# Patient Record
Sex: MALE | Race: OTHER | NOT HISPANIC OR LATINO | URBAN - METROPOLITAN AREA
[De-identification: names, ages, dates, MRNs, and addresses within clinical notes are randomized per-mention and may not be internally consistent; named-entity substitution may affect disease eponyms.]

---

## 2020-06-30 VITALS
RESPIRATION RATE: 20 BRPM | WEIGHT: 205.91 LBS | OXYGEN SATURATION: 98 % | TEMPERATURE: 98 F | HEART RATE: 78 BPM | DIASTOLIC BLOOD PRESSURE: 78 MMHG | SYSTOLIC BLOOD PRESSURE: 125 MMHG | HEIGHT: 70 IN

## 2020-06-30 RX ORDER — CHLORHEXIDINE GLUCONATE 213 G/1000ML
1 SOLUTION TOPICAL ONCE
Refills: 0 | Status: DISCONTINUED | OUTPATIENT
Start: 2020-07-01 | End: 2020-07-01

## 2020-06-30 NOTE — H&P ADULT - ASSESSMENT
64 year old male, current smoker, with PMHx HTN, HLD, Type 2 DM, CAD s/p PCI x3 (most recent 2015 @ [place], [place]), BPH, presents for cardiac catheterization with possible intervention due to patient's risk factors, CCS III anginal symptoms, and abnormal NST.  - EKG:  - ASA: III 	Mallampati: III  - H/H stable: 15.8/49.5. Platelets/Coags stable.  - Patient endorses not taking Aspirin 81 mg in two days. Patient given Aspirin 81 mg and Plavix 600 mg today 07/01/2020.  - Cr stable: ____.  Euvolemic on exam. AS per MD note, normal EF. Patient given NS @ 75 cc/hr x 4 hours  - Patient's last dose of Metformin 06/30/2020 in AM. Patient started on insulin sliding scale. Instructed patient to not resume Metformin until 07/03/2020.  - Patient is a suitable candidate for moderate sedation.   Risks & benefits of procedure and alternative therapy have been explained to the patient including but not limited to: allergic reaction, bleeding w/possible need for blood transfusion, infection, renal and vascular compromise, limb damage, arrhythmia, stroke, vessel dissection/perforation, Myocardial infarction, emergent CABG. Informed consent obtained and in chart. 64 year old male, current smoker, with PMHx HTN, HLD, Type 2 DM, CAD s/p PCI x3 (most recent 2015 @ [place], [place]), BPH, presents for cardiac catheterization with possible intervention due to patient's risk factors, CCS III anginal symptoms, and abnormal NST.  - EKG:  - ASA: III 	Mallampati: III  - H/H stable: 15.8/49.5. Platelets/Coags stable.  - Patient endorses not taking Aspirin 81 mg in two days. Patient given Aspirin 81 mg and Plavix 600 mg today 07/01/2020.  - Cr stable: 1.1  Euvolemic on exam. AS per MD note, normal EF. Patient given NS @ 75 cc/hr x 4 hours  - Patient's last dose of Metformin 06/30/2020 in AM. Patient started on insulin sliding scale. Instructed patient to not resume Metformin until 07/03/2020.  - Patient is a suitable candidate for moderate sedation.   Risks & benefits of procedure and alternative therapy have been explained to the patient including but not limited to: allergic reaction, bleeding w/possible need for blood transfusion, infection, renal and vascular compromise, limb damage, arrhythmia, stroke, vessel dissection/perforation, Myocardial infarction, emergent CABG. Informed consent obtained and in chart.

## 2020-06-30 NOTE — H&P ADULT - HISTORY OF PRESENT ILLNESS
COVID test @ [facility] Madhav Cove 6/29____  VERIFY MEDS  64 year old male, current smoker, with PMHx HTN, HLD, Type 2 DM, CAD s/p PCI x3 (most recent 2015 @ [place], [place]), BPH, presented to cardiologist Dr. Beltran c/o shortness of  breath with exertion. Patient states he develops significant SOB and fatigue after walking for 1 minute. He also endorses intermittent chest pain lasting a couple of minutes, unrelated to exertion. He denies dizziness, palpitations, LOC, orthopnea/PND, or leg swelling. No fevers, chills, cough, or COVID-like symptoms. Pt subsequently underwent pharm NST 6/26/20 revealing medium size reversible perfusion defect of moderate intensity involving the basal-mid inferior/inferoseptal myocardial walls; and small-medium size reversible perfusion defect of moderate intensity involving the mid-apical anteroseptal myocardial wall. Normal LV systolic function.   In light of risk factors, known CAD s/p PCI, CCS III anginal symptoms, and abnormal NST, patient is referred for cardiac catheterization with possible intervention if clinically indicated. COVID test @ [facility] Cove 6/29 NEGATIVE  64 year old male, current smoker, with PMHx HTN, HLD, Type 2 DM, CAD s/p PCI x3 (most recent 2015 @ [place], [place]), BPH, presented to cardiologist Dr. Beltran c/o shortness of  breath with exertion. Patient states he develops significant SOB and fatigue after walking for 1 minute. He also endorses intermittent chest pain lasting a couple of minutes, unrelated to exertion. He denies dizziness, palpitations, LOC, orthopnea/PND, or leg swelling. No fevers, chills, cough, or COVID-like symptoms. Pt subsequently underwent pharm NST 6/26/20 revealing medium size reversible perfusion defect of moderate intensity involving the basal-mid inferior/inferoseptal myocardial walls; and small-medium size reversible perfusion defect of moderate intensity involving the mid-apical anteroseptal myocardial wall. Normal LV systolic function.   In light of risk factors, known CAD s/p PCI, CCS III anginal symptoms, and abnormal NST, patient is referred for cardiac catheterization with possible intervention if clinically indicated. COVID test @ [facility] Mad[facility] Cove 6/29 NEGATIVE, results in paper chart   64 year old male, current smoker, with PMHx HTN, HLD, Type 2 DM, CAD s/p PCI x3 (most recent 2015 @ [place], [place]), BPH, presented to cardiologist Dr. Beltran c/o shortness of  breath with exertion. Patient states he develops significant SOB and fatigue after walking for 1 minute. He also endorses intermittent chest pain lasting a couple of minutes, unrelated to exertion. He denies dizziness, palpitations, LOC, orthopnea/PND, or leg swelling. No fevers, chills, cough, or COVID-like symptoms. Pt subsequently underwent pharm NST 6/26/20 revealing medium size reversible perfusion defect of moderate intensity involving the basal-mid inferior/inferoseptal myocardial walls; and small-medium size reversible perfusion defect of moderate intensity involving the mid-apical anteroseptal myocardial wall. Normal LV systolic function.   In light of risk factors, known CAD s/p PCI, CCS III anginal symptoms, and abnormal NST, patient is referred for cardiac catheterization with possible intervention if clinically indicated.

## 2020-06-30 NOTE — H&P ADULT - NSICDXPASTMEDICALHX_GEN_ALL_CORE_FT
PAST MEDICAL HISTORY:  BPH (benign prostatic hyperplasia)     Coronary artery disease     Hyperlipidemia     Hypertension     Type 2 diabetes mellitus

## 2020-07-01 ENCOUNTER — OUTPATIENT (OUTPATIENT)
Dept: OUTPATIENT SERVICES | Facility: HOSPITAL | Age: 65
LOS: 1 days | Discharge: HOME | End: 2020-07-01
Payer: COMMERCIAL

## 2020-07-01 DIAGNOSIS — Z90.09 ACQUIRED ABSENCE OF OTHER PART OF HEAD AND NECK: Chronic | ICD-10-CM

## 2020-07-01 LAB
A1C WITH ESTIMATED AVERAGE GLUCOSE RESULT: 7 % — HIGH (ref 4–5.6)
A1C WITH ESTIMATED AVERAGE GLUCOSE RESULT: 7.2 % — HIGH (ref 4–5.6)
ALBUMIN SERPL ELPH-MCNC: 4.6 G/DL — SIGNIFICANT CHANGE UP (ref 3.3–5)
ALP SERPL-CCNC: 68 U/L — SIGNIFICANT CHANGE UP (ref 40–120)
ALT FLD-CCNC: 13 U/L — SIGNIFICANT CHANGE UP (ref 10–45)
ANION GAP SERPL CALC-SCNC: 10 MMOL/L — SIGNIFICANT CHANGE UP (ref 5–17)
ANION GAP SERPL CALC-SCNC: 9 MMOL/L — SIGNIFICANT CHANGE UP (ref 5–17)
APTT BLD: 31.5 SEC — SIGNIFICANT CHANGE UP (ref 27.5–35.5)
AST SERPL-CCNC: 12 U/L — SIGNIFICANT CHANGE UP (ref 10–40)
BASOPHILS # BLD AUTO: 0.02 K/UL — SIGNIFICANT CHANGE UP (ref 0–0.2)
BASOPHILS NFR BLD AUTO: 0.3 % — SIGNIFICANT CHANGE UP (ref 0–2)
BILIRUB SERPL-MCNC: 0.5 MG/DL — SIGNIFICANT CHANGE UP (ref 0.2–1.2)
BUN SERPL-MCNC: 17 MG/DL — SIGNIFICANT CHANGE UP (ref 7–23)
BUN SERPL-MCNC: 18 MG/DL — SIGNIFICANT CHANGE UP (ref 7–23)
CALCIUM SERPL-MCNC: 10 MG/DL — SIGNIFICANT CHANGE UP (ref 8.4–10.5)
CALCIUM SERPL-MCNC: 9.3 MG/DL — SIGNIFICANT CHANGE UP (ref 8.4–10.5)
CHLORIDE SERPL-SCNC: 102 MMOL/L — SIGNIFICANT CHANGE UP (ref 96–108)
CHLORIDE SERPL-SCNC: 103 MMOL/L — SIGNIFICANT CHANGE UP (ref 96–108)
CHOLEST SERPL-MCNC: 105 MG/DL — SIGNIFICANT CHANGE UP (ref 10–199)
CK MB CFR SERPL CALC: 2.2 NG/ML — SIGNIFICANT CHANGE UP (ref 0–6.7)
CK SERPL-CCNC: 95 U/L — SIGNIFICANT CHANGE UP (ref 30–200)
CO2 SERPL-SCNC: 27 MMOL/L — SIGNIFICANT CHANGE UP (ref 22–31)
CO2 SERPL-SCNC: 28 MMOL/L — SIGNIFICANT CHANGE UP (ref 22–31)
CREAT SERPL-MCNC: 1.13 MG/DL — SIGNIFICANT CHANGE UP (ref 0.5–1.3)
CREAT SERPL-MCNC: 1.13 MG/DL — SIGNIFICANT CHANGE UP (ref 0.5–1.3)
EOSINOPHIL # BLD AUTO: 0.09 K/UL — SIGNIFICANT CHANGE UP (ref 0–0.5)
EOSINOPHIL NFR BLD AUTO: 1.2 % — SIGNIFICANT CHANGE UP (ref 0–6)
ESTIMATED AVERAGE GLUCOSE: 160 MG/DL — HIGH (ref 68–114)
GLUCOSE SERPL-MCNC: 123 MG/DL — HIGH (ref 70–99)
GLUCOSE SERPL-MCNC: 166 MG/DL — HIGH (ref 70–99)
HCT VFR BLD CALC: 42.2 % — SIGNIFICANT CHANGE UP (ref 39–50)
HCT VFR BLD CALC: 49.5 % — SIGNIFICANT CHANGE UP (ref 39–50)
HCV AB S/CO SERPL IA: 0.1 S/CO — SIGNIFICANT CHANGE UP
HCV AB SERPL-IMP: SIGNIFICANT CHANGE UP
HDLC SERPL-MCNC: 28 MG/DL — LOW
HGB BLD-MCNC: 13.4 G/DL — SIGNIFICANT CHANGE UP (ref 13–17)
HGB BLD-MCNC: 15.8 G/DL — SIGNIFICANT CHANGE UP (ref 13–17)
IMM GRANULOCYTES NFR BLD AUTO: 0.6 % — SIGNIFICANT CHANGE UP (ref 0–1.5)
INR BLD: 1.03 — SIGNIFICANT CHANGE UP (ref 0.88–1.16)
LIPID PNL WITH DIRECT LDL SERPL: 48 MG/DL — SIGNIFICANT CHANGE UP
LYMPHOCYTES # BLD AUTO: 2.56 K/UL — SIGNIFICANT CHANGE UP (ref 1–3.3)
LYMPHOCYTES # BLD AUTO: 35.5 % — SIGNIFICANT CHANGE UP (ref 13–44)
MAGNESIUM SERPL-MCNC: 1.4 MG/DL — LOW (ref 1.6–2.6)
MCHC RBC-ENTMCNC: 27.1 PG — SIGNIFICANT CHANGE UP (ref 27–34)
MCHC RBC-ENTMCNC: 27.4 PG — SIGNIFICANT CHANGE UP (ref 27–34)
MCHC RBC-ENTMCNC: 31.8 GM/DL — LOW (ref 32–36)
MCHC RBC-ENTMCNC: 31.9 GM/DL — LOW (ref 32–36)
MCV RBC AUTO: 85.3 FL — SIGNIFICANT CHANGE UP (ref 80–100)
MCV RBC AUTO: 85.8 FL — SIGNIFICANT CHANGE UP (ref 80–100)
MONOCYTES # BLD AUTO: 0.53 K/UL — SIGNIFICANT CHANGE UP (ref 0–0.9)
MONOCYTES NFR BLD AUTO: 7.3 % — SIGNIFICANT CHANGE UP (ref 2–14)
NEUTROPHILS # BLD AUTO: 3.98 K/UL — SIGNIFICANT CHANGE UP (ref 1.8–7.4)
NEUTROPHILS NFR BLD AUTO: 55.1 % — SIGNIFICANT CHANGE UP (ref 43–77)
NRBC # BLD: 0 /100 WBCS — SIGNIFICANT CHANGE UP (ref 0–0)
NRBC # BLD: 0 /100 WBCS — SIGNIFICANT CHANGE UP (ref 0–0)
PLATELET # BLD AUTO: 181 K/UL — SIGNIFICANT CHANGE UP (ref 150–400)
PLATELET # BLD AUTO: 199 K/UL — SIGNIFICANT CHANGE UP (ref 150–400)
POTASSIUM SERPL-MCNC: 4.1 MMOL/L — SIGNIFICANT CHANGE UP (ref 3.5–5.3)
POTASSIUM SERPL-MCNC: 4.5 MMOL/L — SIGNIFICANT CHANGE UP (ref 3.5–5.3)
POTASSIUM SERPL-SCNC: 4.1 MMOL/L — SIGNIFICANT CHANGE UP (ref 3.5–5.3)
POTASSIUM SERPL-SCNC: 4.5 MMOL/L — SIGNIFICANT CHANGE UP (ref 3.5–5.3)
PROT SERPL-MCNC: 7.9 G/DL — SIGNIFICANT CHANGE UP (ref 6–8.3)
PROTHROM AB SERPL-ACNC: 12.3 SEC — SIGNIFICANT CHANGE UP (ref 10.6–13.6)
RBC # BLD: 4.95 M/UL — SIGNIFICANT CHANGE UP (ref 4.2–5.8)
RBC # BLD: 5.77 M/UL — SIGNIFICANT CHANGE UP (ref 4.2–5.8)
RBC # FLD: 14.4 % — SIGNIFICANT CHANGE UP (ref 10.3–14.5)
RBC # FLD: 14.4 % — SIGNIFICANT CHANGE UP (ref 10.3–14.5)
SODIUM SERPL-SCNC: 139 MMOL/L — SIGNIFICANT CHANGE UP (ref 135–145)
SODIUM SERPL-SCNC: 140 MMOL/L — SIGNIFICANT CHANGE UP (ref 135–145)
TOTAL CHOLESTEROL/HDL RATIO MEASUREMENT: 3.8 RATIO — SIGNIFICANT CHANGE UP (ref 3.4–9.6)
TRIGL SERPL-MCNC: 147 MG/DL — SIGNIFICANT CHANGE UP (ref 10–149)
WBC # BLD: 6.01 K/UL — SIGNIFICANT CHANGE UP (ref 3.8–10.5)
WBC # BLD: 7.22 K/UL — SIGNIFICANT CHANGE UP (ref 3.8–10.5)
WBC # FLD AUTO: 6.01 K/UL — SIGNIFICANT CHANGE UP (ref 3.8–10.5)
WBC # FLD AUTO: 7.22 K/UL — SIGNIFICANT CHANGE UP (ref 3.8–10.5)

## 2020-07-01 PROCEDURE — 85610 PROTHROMBIN TIME: CPT

## 2020-07-01 PROCEDURE — C9600: CPT | Mod: LD

## 2020-07-01 PROCEDURE — 82550 ASSAY OF CK (CPK): CPT

## 2020-07-01 PROCEDURE — 85025 COMPLETE CBC W/AUTO DIFF WBC: CPT

## 2020-07-01 PROCEDURE — 82553 CREATINE MB FRACTION: CPT

## 2020-07-01 PROCEDURE — 93454 CORONARY ARTERY ANGIO S&I: CPT

## 2020-07-01 PROCEDURE — 93010 ELECTROCARDIOGRAM REPORT: CPT | Mod: 77

## 2020-07-01 PROCEDURE — 85027 COMPLETE CBC AUTOMATED: CPT

## 2020-07-01 PROCEDURE — 86803 HEPATITIS C AB TEST: CPT

## 2020-07-01 PROCEDURE — 83036 HEMOGLOBIN GLYCOSYLATED A1C: CPT

## 2020-07-01 PROCEDURE — 80048 BASIC METABOLIC PNL TOTAL CA: CPT

## 2020-07-01 PROCEDURE — 85730 THROMBOPLASTIN TIME PARTIAL: CPT

## 2020-07-01 PROCEDURE — C1887: CPT

## 2020-07-01 PROCEDURE — 80061 LIPID PANEL: CPT

## 2020-07-01 PROCEDURE — 82962 GLUCOSE BLOOD TEST: CPT

## 2020-07-01 PROCEDURE — C1874: CPT

## 2020-07-01 PROCEDURE — 93005 ELECTROCARDIOGRAM TRACING: CPT

## 2020-07-01 PROCEDURE — 80053 COMPREHEN METABOLIC PANEL: CPT

## 2020-07-01 PROCEDURE — C1769: CPT

## 2020-07-01 PROCEDURE — 93010 ELECTROCARDIOGRAM REPORT: CPT

## 2020-07-01 PROCEDURE — C1894: CPT

## 2020-07-01 PROCEDURE — C1725: CPT

## 2020-07-01 PROCEDURE — 83735 ASSAY OF MAGNESIUM: CPT

## 2020-07-01 RX ORDER — ASPIRIN/CALCIUM CARB/MAGNESIUM 324 MG
325 TABLET ORAL ONCE
Refills: 0 | Status: COMPLETED | OUTPATIENT
Start: 2020-07-01 | End: 2020-07-01

## 2020-07-01 RX ORDER — DEXTROSE 50 % IN WATER 50 %
25 SYRINGE (ML) INTRAVENOUS ONCE
Refills: 0 | Status: DISCONTINUED | OUTPATIENT
Start: 2020-07-01 | End: 2020-07-16

## 2020-07-01 RX ORDER — CLOPIDOGREL BISULFATE 75 MG/1
1 TABLET, FILM COATED ORAL
Qty: 30 | Refills: 11
Start: 2020-07-01 | End: 2021-06-25

## 2020-07-01 RX ORDER — ACETAMINOPHEN 500 MG
650 TABLET ORAL ONCE
Refills: 0 | Status: COMPLETED | OUTPATIENT
Start: 2020-07-01 | End: 2020-07-01

## 2020-07-01 RX ORDER — SODIUM CHLORIDE 9 MG/ML
1000 INJECTION, SOLUTION INTRAVENOUS
Refills: 0 | Status: DISCONTINUED | OUTPATIENT
Start: 2020-07-01 | End: 2020-07-16

## 2020-07-01 RX ORDER — DEXTROSE 50 % IN WATER 50 %
15 SYRINGE (ML) INTRAVENOUS ONCE
Refills: 0 | Status: DISCONTINUED | OUTPATIENT
Start: 2020-07-01 | End: 2020-07-16

## 2020-07-01 RX ORDER — MAGNESIUM SULFATE 500 MG/ML
2 VIAL (ML) INJECTION ONCE
Refills: 0 | Status: COMPLETED | OUTPATIENT
Start: 2020-07-01 | End: 2020-07-01

## 2020-07-01 RX ORDER — SODIUM CHLORIDE 9 MG/ML
500 INJECTION INTRAMUSCULAR; INTRAVENOUS; SUBCUTANEOUS
Refills: 0 | Status: DISCONTINUED | OUTPATIENT
Start: 2020-07-01 | End: 2020-07-01

## 2020-07-01 RX ORDER — CLOPIDOGREL BISULFATE 75 MG/1
600 TABLET, FILM COATED ORAL ONCE
Refills: 0 | Status: COMPLETED | OUTPATIENT
Start: 2020-07-01 | End: 2020-07-01

## 2020-07-01 RX ORDER — GLUCAGON INJECTION, SOLUTION 0.5 MG/.1ML
1 INJECTION, SOLUTION SUBCUTANEOUS ONCE
Refills: 0 | Status: DISCONTINUED | OUTPATIENT
Start: 2020-07-01 | End: 2020-07-16

## 2020-07-01 RX ORDER — INSULIN LISPRO 100/ML
VIAL (ML) SUBCUTANEOUS ONCE
Refills: 0 | Status: DISCONTINUED | OUTPATIENT
Start: 2020-07-01 | End: 2020-07-16

## 2020-07-01 RX ORDER — MAGNESIUM OXIDE 400 MG ORAL TABLET 241.3 MG
800 TABLET ORAL ONCE
Refills: 0 | Status: COMPLETED | OUTPATIENT
Start: 2020-07-01 | End: 2020-07-01

## 2020-07-01 RX ORDER — DEXTROSE 50 % IN WATER 50 %
12.5 SYRINGE (ML) INTRAVENOUS ONCE
Refills: 0 | Status: DISCONTINUED | OUTPATIENT
Start: 2020-07-01 | End: 2020-07-16

## 2020-07-01 RX ORDER — SODIUM CHLORIDE 9 MG/ML
500 INJECTION INTRAMUSCULAR; INTRAVENOUS; SUBCUTANEOUS
Refills: 0 | Status: DISCONTINUED | OUTPATIENT
Start: 2020-07-01 | End: 2020-07-16

## 2020-07-01 RX ADMIN — Medication 325 MILLIGRAM(S): at 12:53

## 2020-07-01 RX ADMIN — Medication 650 MILLIGRAM(S): at 18:45

## 2020-07-01 RX ADMIN — CLOPIDOGREL BISULFATE 600 MILLIGRAM(S): 75 TABLET, FILM COATED ORAL at 12:53

## 2020-07-01 RX ADMIN — Medication 50 GRAM(S): at 19:59

## 2020-07-01 RX ADMIN — MAGNESIUM OXIDE 400 MG ORAL TABLET 800 MILLIGRAM(S): 241.3 TABLET ORAL at 19:59

## 2020-07-01 NOTE — PROGRESS NOTE ADULT - SUBJECTIVE AND OBJECTIVE BOX
Interventional Cardiology PA Post PCI SDA Discharge Note      Patient without complaints. Ambulated and voided without difficulties    Afebrile, VSS    PRESCRIPTIONS/HOME MEDICATIONS:  alogliptin-metformin 12.5 mg-1000 mg oral tablet: 1 tab(s) orally 2 times a day  Aspirin Enteric Coated 81 mg oral delayed release tablet: 1 tab(s) orally once a day  carvedilol 3.125 mg oral tablet: 1 tab(s) orally 2 times a day  ezetimibe 10 mg oral tablet: 1 tab(s) orally once a day  losartan 25 mg oral tablet: 1 tab(s) orally once a day  pantoprazole 40 mg oral delayed release tablet: 1 tab(s) orally once a day  Plavix 75 mg oral tablet: 1 tab(s) orally once a day   rosuvastatin 40 mg oral tablet: 1 tab(s) orally once a day  tamsulosin 0.4 mg oral capsule: 1 cap(s) orally once a day        CURRENT MEDICATIONS:   dextrose 40% Gel 15 Gram(s) Oral Once PRN  dextrose 5%. 1000 milliLiter(s) IV Continuous <Continuous>  dextrose 50% Injectable 12.5 Gram(s) IV Push Once  dextrose 50% Injectable 25 Gram(s) IV Push Once  dextrose 50% Injectable 25 Gram(s) IV Push Once  glucagon  Injectable 1 milliGRAM(s) IntraMuscular Once PRN  insulin lispro (HumaLOG) corrective regimen sliding scale   SubCutaneous Once  sodium chloride 0.9%. 500 milliLiter(s) IV Continuous <Continuous>        Ext: Right Radial: No hematoma, no bleeding, dressing; C/D/I      Pulses: 2+ intact RAD to baseline     A/P:  64 year old male, current smoker, with PMHx HTN, HLD, Type 2 DM, CAD s/p PCI x3 (most recent 2015 @ [place], [place]), BPH, presents for cardiac catheterization with possible intervention due to patient's risk factors, CCS III anginal symptoms, and abnormal NST.  Pt is now s/p cardiac cath on 7/1/20: s/p ANCA to pLAD and ANCA to mLAD. LCx 50-60%. Patent prior RCA stents    1.       Follow-up with PMD/Cardiologist Dr. Beltran in 72 hours.  2.       Post procedure labs/EKG reviewed and stable.    3.       Pt given instructions on importance of taking antiplatelet medication.    4.       Stable for discharge as per attending Dr. Beltran after bed rest, pt voids, wrist stable and 30 minutes of ambulation.  5.       Prescriptions for Aspirin/Plavix e-prescribed and submitted to patient's pharmacy.  6.       Patient will continue statin Rosuvastatin 40mg QHS  7.       Discharge forms signed and copies in chart   8.       Discharge Order Entered Interventional Cardiology PA Post PCI SDA Discharge Note      Patient without complaints. Ambulated and voided without difficulties    Afebrile, VSS    PRESCRIPTIONS/HOME MEDICATIONS:  alogliptin-metformin 12.5 mg-1000 mg oral tablet: 1 tab(s) orally 2 times a day  Aspirin Enteric Coated 81 mg oral delayed release tablet: 1 tab(s) orally once a day  carvedilol 3.125 mg oral tablet: 1 tab(s) orally 2 times a day  ezetimibe 10 mg oral tablet: 1 tab(s) orally once a day  losartan 25 mg oral tablet: 1 tab(s) orally once a day  pantoprazole 40 mg oral delayed release tablet: 1 tab(s) orally once a day  Plavix 75 mg oral tablet: 1 tab(s) orally once a day   rosuvastatin 40 mg oral tablet: 1 tab(s) orally once a day  tamsulosin 0.4 mg oral capsule: 1 cap(s) orally once a day        CURRENT MEDICATIONS:   dextrose 40% Gel 15 Gram(s) Oral Once PRN  dextrose 5%. 1000 milliLiter(s) IV Continuous <Continuous>  dextrose 50% Injectable 12.5 Gram(s) IV Push Once  dextrose 50% Injectable 25 Gram(s) IV Push Once  dextrose 50% Injectable 25 Gram(s) IV Push Once  glucagon  Injectable 1 milliGRAM(s) IntraMuscular Once PRN  insulin lispro (HumaLOG) corrective regimen sliding scale   SubCutaneous Once  sodium chloride 0.9%. 500 milliLiter(s) IV Continuous <Continuous>        Ext: Right Radial: No hematoma, no bleeding, dressing; C/D/I      Pulses: 2+ intact RAD to baseline     A/P:  64 year old male, current smoker, with PMHx HTN, HLD, Type 2 DM, CAD s/p PCI x3 (most recent 2015 @ [place], [place]), BPH, presents for cardiac catheterization with possible intervention due to patient's risk factors, CCS III anginal symptoms, and abnormal NST.  Pt is now s/p cardiac cath on 7/1/20: s/p ANCA to pLAD and ANCA to mLAD.  Findings: Right dominant circulation, LM normal, pLAD w/ aneurysmal changes, mLAD 90%, dLAD 80%, LCx aneurysmal dilation of prox and mid    1.       Follow-up with PMD/Cardiologist Dr. Beltran in 72 hours.  2.       Post procedure labs/EKG reviewed and stable.    3.       Pt given instructions on importance of taking antiplatelet medication.    4.       Stable for discharge as per attending Dr. Beltran after bed rest, pt voids, wrist stable and 30 minutes of ambulation.  5.       Prescriptions for Aspirin/Plavix e-prescribed and submitted to patient's pharmacy.  6.       Patient will continue statin Rosuvastatin 40mg QHS  7.       Discharge forms signed and copies in chart   8.       Discharge Order Entered Interventional Cardiology PA Post PCI SDA Discharge Note      Patient without complaints. Ambulated and voided without difficulties    Afebrile, VSS    PRESCRIPTIONS/HOME MEDICATIONS:  alogliptin-metformin 12.5 mg-1000 mg oral tablet: 1 tab(s) orally 2 times a day  Aspirin Enteric Coated 81 mg oral delayed release tablet: 1 tab(s) orally once a day  carvedilol 3.125 mg oral tablet: 1 tab(s) orally 2 times a day  ezetimibe 10 mg oral tablet: 1 tab(s) orally once a day  losartan 25 mg oral tablet: 1 tab(s) orally once a day  pantoprazole 40 mg oral delayed release tablet: 1 tab(s) orally once a day  Plavix 75 mg oral tablet: 1 tab(s) orally once a day   rosuvastatin 40 mg oral tablet: 1 tab(s) orally once a day  tamsulosin 0.4 mg oral capsule: 1 cap(s) orally once a day        CURRENT MEDICATIONS:   dextrose 40% Gel 15 Gram(s) Oral Once PRN  dextrose 5%. 1000 milliLiter(s) IV Continuous <Continuous>  dextrose 50% Injectable 12.5 Gram(s) IV Push Once  dextrose 50% Injectable 25 Gram(s) IV Push Once  dextrose 50% Injectable 25 Gram(s) IV Push Once  glucagon  Injectable 1 milliGRAM(s) IntraMuscular Once PRN  insulin lispro (HumaLOG) corrective regimen sliding scale   SubCutaneous Once  sodium chloride 0.9%. 500 milliLiter(s) IV Continuous <Continuous>        Ext: Right Radial: No hematoma, no bleeding, dressing; C/D/I      Pulses: 2+ intact RAD to baseline     A/P:  64 year old male, current smoker, with PMHx HTN, HLD, Type 2 DM, CAD s/p PCI x3 (most recent 2015 @ [place], [place]), BPH, presents for cardiac catheterization with possible intervention due to patient's risk factors, CCS III anginal symptoms, and abnormal NST.  Pt is now s/p cardiac cath on 7/1/20: s/p ANCA to pLAD and ANCA to mLAD.  Findings: Right dominant circulation, LM normal, pLAD w/ aneurysmal changes, mLAD 90%, dLAD 80%, LCx aneurysmal dilation of prox and mid segements with 60% stenosis of proximal segment at takeoff of OM1, previously placed stent at p-mRCA widely patent.    1.       Follow-up with PMD/Cardiologist Dr. Beltran in 72 hours.  2.       Post procedure labs/EKG reviewed and stable.    3.       Pt given instructions on importance of taking antiplatelet medication.    4.       Stable for discharge as per attending Dr. Beltran after bed rest, pt voids, wrist stable and 30 minutes of ambulation.  5.       Prescriptions for Aspirin/Plavix e-prescribed and submitted to patient's pharmacy.  6.       Patient will continue statin Rosuvastatin 40mg QHS  7.       Discharge forms signed and copies in chart   8.       Discharge Order Entered

## 2020-07-01 NOTE — PROGRESS NOTE ADULT - SUBJECTIVE AND OBJECTIVE BOX
SDA DISCHARGE INSTRUCTIONS    You underwent a coronary angiogram and received 2 stents. You should wait 3 days before returning to ordinary activities. Do not drive for 2 days. Consult your doctor before returning to vigorous activity. You may return to work in 3-5 days. The catheter from your wrist was removed and you should remove the dressing in 24 hours. You may shower once the dressing is removed, but avoid baths, hot tubs, or swimming for 5 days to prevent infection. If you notice bleeding from the site, hardening and pain at the site, drainage or redness from the site, coolness/paleness of the extremity, swelling, or fever, please call [phone]. Please continue your Aspirin and Plavix as prescribed unless otherwise indicated by your cardiologist. All of your prescriptions have been sent to the pharmacy. Please follow up with Dr. Beltran within 1-2 weeks. All of your needed prescriptions have been sent electronically to your pharmacy.                                                                                          PA Signature                                     Date/Time                                                                                                                                                                         __________________________________________                                                                                          RN Signature                                    Date/Time                                                                                                                                                                          _________________________________________                                                                                                                                                                                                                                                     Patient Signature                                Date/Time                                                                                        __________________________________________ SDA DISCHARGE INSTRUCTIONS    You underwent a coronary angiogram and received 2 stents. You should wait 3 days before returning to ordinary activities. Do not drive for 2 days. Consult your doctor before returning to vigorous activity. You may return to work in 3-5 days. The catheter from your wrist was removed and you should remove the dressing in 24 hours. You may shower once the dressing is removed, but avoid baths, hot tubs, or swimming for 5 days to prevent infection. If you notice bleeding from the site, hardening and pain at the site, drainage or redness from the site, coolness/paleness of the extremity, swelling, or fever, please call [phone]. Please continue your Aspirin and Plavix as prescribed unless otherwise indicated by your cardiologist. All of your prescriptions have been sent to the pharmacy. Please follow up with Dr. Beltran within 1-2 weeks. All of your needed prescriptions have been sent electronically to your pharmacy.                                                                                          PA Signature                                     Date/Time                                                                                                                                                                         __________________________________________                                                                                          RN Signature                                    Date/Time                                                                                                                                                                          _________________________________________                                                                                                                                                                                                                                                     Patient Signature                                Date/Time                                                                                        __________________________________________  Signed copies in chart

## 2020-07-14 DIAGNOSIS — R94.39 ABNORMAL RESULT OF OTHER CARDIOVASCULAR FUNCTION STUDY: ICD-10-CM

## 2020-07-14 DIAGNOSIS — I10 ESSENTIAL (PRIMARY) HYPERTENSION: ICD-10-CM

## 2020-07-14 DIAGNOSIS — I25.110 ATHEROSCLEROTIC HEART DISEASE OF NATIVE CORONARY ARTERY WITH UNSTABLE ANGINA PECTORIS: ICD-10-CM

## 2020-07-14 DIAGNOSIS — Z82.49 FAMILY HISTORY OF ISCHEMIC HEART DISEASE AND OTHER DISEASES OF THE CIRCULATORY SYSTEM: ICD-10-CM

## 2020-07-14 DIAGNOSIS — E78.5 HYPERLIPIDEMIA, UNSPECIFIED: ICD-10-CM

## 2022-11-28 VITALS — RESPIRATION RATE: 18 BRPM | OXYGEN SATURATION: 98 % | HEART RATE: 66 BPM | TEMPERATURE: 98 F

## 2022-11-28 PROBLEM — E11.9 TYPE 2 DIABETES MELLITUS WITHOUT COMPLICATIONS: Chronic | Status: ACTIVE | Noted: 2020-06-30

## 2022-11-28 PROBLEM — I25.10 ATHEROSCLEROTIC HEART DISEASE OF NATIVE CORONARY ARTERY WITHOUT ANGINA PECTORIS: Chronic | Status: ACTIVE | Noted: 2020-06-30

## 2022-11-28 PROBLEM — N40.0 BENIGN PROSTATIC HYPERPLASIA WITHOUT LOWER URINARY TRACT SYMPTOMS: Chronic | Status: ACTIVE | Noted: 2020-06-30

## 2022-11-28 PROBLEM — E78.5 HYPERLIPIDEMIA, UNSPECIFIED: Chronic | Status: ACTIVE | Noted: 2020-06-30

## 2022-11-28 PROBLEM — I10 ESSENTIAL (PRIMARY) HYPERTENSION: Chronic | Status: ACTIVE | Noted: 2020-06-30

## 2022-11-28 RX ORDER — CHLORHEXIDINE GLUCONATE 213 G/1000ML
1 SOLUTION TOPICAL ONCE
Refills: 0 | Status: DISCONTINUED | OUTPATIENT
Start: 2022-11-29 | End: 2022-12-13

## 2022-11-28 NOTE — H&P ADULT - ASSESSMENT
y/o male, current smoker, w/ PMHx HTN, HLD, Type II DM, CAD (s/p prior PCI, most recent cardiac cath 07/2020 w/ ANCA x 2 prox/mid LAD and prior prox/mid RCA stent patent), and BPH who presented to outpatient cardiologist, Dr. Beltran, endorsing exertional chest pain and dyspnea. Myocardial Perfusion (11/12/2022) revealed medium sized, reversible perfusion defect of moderate intensity involving the basal to mid inferior/inferolateral myocardial walls, small sized, reversible perfusion defect of mild to moderate intensity involving the apical anterior/anteroseptal myocardial walls, and EF 54%. Presents to [facility] for cardiac cath with intervention if indicated.     Malampatti 2  ASA 3  Pt H+H and platelets WNL, Pt without any reports of BRBPR, hematuria, prior ICH, melena and no bleeding risks. Pt reports compliance with 81mg ASA at home and did not take today, patient stopped his 75mg Plavix last year and loaded with 600mg this morning    Pt Cr.WNL and pre cath fluids ordered per protocol 250ml IV bolus over 30min, and maintenance 75 ml for 2 hours, EF 54% per stress echo   Pt is a Candidate for Moderate Sedation   Risks & benefits of procedure and alternative therapy have been explained to the patient including but not limited to: allergic reaction, bleeding w/possible need for blood transfusion, infection, renal and vascular compromise, limb damage, arrhythmia, stroke, vessel dissection/perforation, Myocardial infarction, emergent CABG. Informed consent obtained and in chart.       Suitable for moderate sedation.

## 2022-11-28 NOTE — H&P ADULT - NSHPLABSRESULTS_GEN_ALL_CORE
15.4   6.21  )-----------( 197      ( 29 Nov 2022 10:22 )             48.2   11-29    136  |  103  |  22  ----------------------------<  102<H>  4.5   |  23  |  1.20    Ca    9.5      29 Nov 2022 10:22  Mg     1.7     11-29    TPro  7.8  /  Alb  4.4  /  TBili  0.3  /  DBili  x   /  AST  19  /  ALT  22  /  AlkPhos  67  11-29      PT/INR - ( 29 Nov 2022 10:22 )   PT: 12.3 sec;   INR: 1.03          PTT - ( 29 Nov 2022 10:22 )  PTT:30.8 sec    EKG: NSR no st changes

## 2022-11-28 NOTE — H&P ADULT - HISTORY OF PRESENT ILLNESS
COVID: ______  Cardiologist: Dr. Beltran   Pharmacy: _____  Escort: _____    67 y/o male, current smoker, w/ PMHx HTN, HLD, Type II DM, CAD (s/p prior PCI, most recent cardiac cath 07/2020 w/ ANCA x 2 prox/mid LAD and prior prox/mid RCA stent patent), and BPH who presented to outpatient cardiologist, Dr. Beltran, endorsing exertional chest pain and dyspnea, typically experienced w/ _____ and relieved w/ rest. Patient denies any dizziness, diaphoresis, syncope, palpitations, abdominal pain, nausea/vomiting, melena, LE edema, fever, chills, and cough. Myocardial Perfusion (11/12/2022) revealed medium sized, reversible perfusion defect of moderate intensity involving the basal to mid inferior/inferolateral myocardial walls, small sized, reversible perfusion defect of mild to moderate intensity involving the apical anterior/anteroseptal myocardial walls, and EF 54%.    In light of patient's risk factors, CCS Class III anginal symptoms, and abnormal Myocardial Perfusion results, patient now presents for cardiac catheterization w/ possible intervention if clinically indicated.  COVID: Negative 11/29 [facility]   Cardiologist: Dr. Beltran   Pharmacy: [facility] [phone] [address] [place] [address]  Escort: Friend     65 y/o male, current smoker, w/ PMHx HTN, HLD, Type II DM, CAD (s/p prior PCI, most recent cardiac cath 07/2020 w/ ANCA x 2 prox/mid LAD and prior prox/mid RCA stent patent), and BPH who presented to outpatient cardiologist, Dr. Beltran, endorsing exertional chest pain and dyspnea. Patient denies any dizziness, diaphoresis, syncope, palpitations, abdominal pain, nausea/vomiting, melena, LE edema, fever, chills, and cough. Myocardial Perfusion (11/12/2022) revealed medium sized, reversible perfusion defect of moderate intensity involving the basal to mid inferior/inferolateral myocardial walls, small sized, reversible perfusion defect of mild to moderate intensity involving the apical anterior/anteroseptal myocardial walls, and EF 54%.    In light of patient's risk factors, CCS Class III anginal symptoms, and abnormal Myocardial Perfusion results, patient now presents for cardiac catheterization w/ possible intervention if clinically indicated.

## 2022-11-28 NOTE — H&P ADULT - NEUROLOGICAL COMMENTS
Patient reporting some numbness in his right hand first 3 digits, no deficits noted on exam equal strength and tactile sensation intact

## 2022-11-29 ENCOUNTER — OUTPATIENT (OUTPATIENT)
Dept: OUTPATIENT SERVICES | Facility: HOSPITAL | Age: 67
LOS: 1 days | Discharge: HOME | End: 2022-11-29
Payer: MEDICARE

## 2022-11-29 DIAGNOSIS — Z90.09 ACQUIRED ABSENCE OF OTHER PART OF HEAD AND NECK: Chronic | ICD-10-CM

## 2022-11-29 LAB
A1C WITH ESTIMATED AVERAGE GLUCOSE RESULT: 7.4 % — HIGH (ref 4–5.6)
ALBUMIN SERPL ELPH-MCNC: 4.4 G/DL — SIGNIFICANT CHANGE UP (ref 3.3–5)
ALP SERPL-CCNC: 67 U/L — SIGNIFICANT CHANGE UP (ref 40–120)
ALT FLD-CCNC: 22 U/L — SIGNIFICANT CHANGE UP (ref 10–45)
ANION GAP SERPL CALC-SCNC: 10 MMOL/L — SIGNIFICANT CHANGE UP (ref 5–17)
ANION GAP SERPL CALC-SCNC: 9 MMOL/L — SIGNIFICANT CHANGE UP (ref 5–17)
APTT BLD: 30.8 SEC — SIGNIFICANT CHANGE UP (ref 27.5–35.5)
AST SERPL-CCNC: 19 U/L — SIGNIFICANT CHANGE UP (ref 10–40)
BASOPHILS # BLD AUTO: 0.05 K/UL — SIGNIFICANT CHANGE UP (ref 0–0.2)
BASOPHILS NFR BLD AUTO: 0.8 % — SIGNIFICANT CHANGE UP (ref 0–2)
BILIRUB SERPL-MCNC: 0.3 MG/DL — SIGNIFICANT CHANGE UP (ref 0.2–1.2)
BUN SERPL-MCNC: 20 MG/DL — SIGNIFICANT CHANGE UP (ref 7–23)
BUN SERPL-MCNC: 22 MG/DL — SIGNIFICANT CHANGE UP (ref 7–23)
CALCIUM SERPL-MCNC: 8.8 MG/DL — SIGNIFICANT CHANGE UP (ref 8.4–10.5)
CALCIUM SERPL-MCNC: 9.5 MG/DL — SIGNIFICANT CHANGE UP (ref 8.4–10.5)
CHLORIDE SERPL-SCNC: 103 MMOL/L — SIGNIFICANT CHANGE UP (ref 96–108)
CHLORIDE SERPL-SCNC: 107 MMOL/L — SIGNIFICANT CHANGE UP (ref 96–108)
CHOLEST SERPL-MCNC: 164 MG/DL — SIGNIFICANT CHANGE UP
CK MB CFR SERPL CALC: 3.6 NG/ML — SIGNIFICANT CHANGE UP (ref 0–6.7)
CK SERPL-CCNC: 151 U/L — SIGNIFICANT CHANGE UP (ref 30–200)
CO2 SERPL-SCNC: 23 MMOL/L — SIGNIFICANT CHANGE UP (ref 22–31)
CO2 SERPL-SCNC: 24 MMOL/L — SIGNIFICANT CHANGE UP (ref 22–31)
CREAT SERPL-MCNC: 1.2 MG/DL — SIGNIFICANT CHANGE UP (ref 0.5–1.3)
CREAT SERPL-MCNC: 1.21 MG/DL — SIGNIFICANT CHANGE UP (ref 0.5–1.3)
EGFR: 66 ML/MIN/1.73M2 — SIGNIFICANT CHANGE UP
EGFR: 67 ML/MIN/1.73M2 — SIGNIFICANT CHANGE UP
EOSINOPHIL # BLD AUTO: 0.13 K/UL — SIGNIFICANT CHANGE UP (ref 0–0.5)
EOSINOPHIL NFR BLD AUTO: 2.1 % — SIGNIFICANT CHANGE UP (ref 0–6)
ESTIMATED AVERAGE GLUCOSE: 166 MG/DL — HIGH (ref 68–114)
GLUCOSE BLDC GLUCOMTR-MCNC: 43 MG/DL — CRITICAL LOW (ref 70–99)
GLUCOSE BLDC GLUCOMTR-MCNC: 68 MG/DL — LOW (ref 70–99)
GLUCOSE BLDC GLUCOMTR-MCNC: 97 MG/DL — SIGNIFICANT CHANGE UP (ref 70–99)
GLUCOSE SERPL-MCNC: 102 MG/DL — HIGH (ref 70–99)
GLUCOSE SERPL-MCNC: 130 MG/DL — HIGH (ref 70–99)
HCT VFR BLD CALC: 42.8 % — SIGNIFICANT CHANGE UP (ref 39–50)
HCT VFR BLD CALC: 48.2 % — SIGNIFICANT CHANGE UP (ref 39–50)
HDLC SERPL-MCNC: 29 MG/DL — LOW
HGB BLD-MCNC: 13.8 G/DL — SIGNIFICANT CHANGE UP (ref 13–17)
HGB BLD-MCNC: 15.4 G/DL — SIGNIFICANT CHANGE UP (ref 13–17)
IMM GRANULOCYTES NFR BLD AUTO: 0.5 % — SIGNIFICANT CHANGE UP (ref 0–0.9)
INR BLD: 1.03 — SIGNIFICANT CHANGE UP (ref 0.88–1.16)
ISTAT ACTK (ACTIVATED CLOTTING TIME KAOLIN): 323 SEC — HIGH (ref 74–137)
LIPID PNL WITH DIRECT LDL SERPL: 78 MG/DL — SIGNIFICANT CHANGE UP
LYMPHOCYTES # BLD AUTO: 2.43 K/UL — SIGNIFICANT CHANGE UP (ref 1–3.3)
LYMPHOCYTES # BLD AUTO: 39.1 % — SIGNIFICANT CHANGE UP (ref 13–44)
MAGNESIUM SERPL-MCNC: 1.5 MG/DL — LOW (ref 1.6–2.6)
MAGNESIUM SERPL-MCNC: 1.7 MG/DL — SIGNIFICANT CHANGE UP (ref 1.6–2.6)
MCHC RBC-ENTMCNC: 27 PG — SIGNIFICANT CHANGE UP (ref 27–34)
MCHC RBC-ENTMCNC: 27.1 PG — SIGNIFICANT CHANGE UP (ref 27–34)
MCHC RBC-ENTMCNC: 32 GM/DL — SIGNIFICANT CHANGE UP (ref 32–36)
MCHC RBC-ENTMCNC: 32.2 GM/DL — SIGNIFICANT CHANGE UP (ref 32–36)
MCV RBC AUTO: 84.1 FL — SIGNIFICANT CHANGE UP (ref 80–100)
MCV RBC AUTO: 84.4 FL — SIGNIFICANT CHANGE UP (ref 80–100)
MONOCYTES # BLD AUTO: 0.43 K/UL — SIGNIFICANT CHANGE UP (ref 0–0.9)
MONOCYTES NFR BLD AUTO: 6.9 % — SIGNIFICANT CHANGE UP (ref 2–14)
NEUTROPHILS # BLD AUTO: 3.14 K/UL — SIGNIFICANT CHANGE UP (ref 1.8–7.4)
NEUTROPHILS NFR BLD AUTO: 50.6 % — SIGNIFICANT CHANGE UP (ref 43–77)
NON HDL CHOLESTEROL: 135 MG/DL — HIGH
NRBC # BLD: 0 /100 WBCS — SIGNIFICANT CHANGE UP (ref 0–0)
NRBC # BLD: 0 /100 WBCS — SIGNIFICANT CHANGE UP (ref 0–0)
PLATELET # BLD AUTO: 173 K/UL — SIGNIFICANT CHANGE UP (ref 150–400)
PLATELET # BLD AUTO: 197 K/UL — SIGNIFICANT CHANGE UP (ref 150–400)
POTASSIUM SERPL-MCNC: 4.3 MMOL/L — SIGNIFICANT CHANGE UP (ref 3.5–5.3)
POTASSIUM SERPL-MCNC: 4.5 MMOL/L — SIGNIFICANT CHANGE UP (ref 3.5–5.3)
POTASSIUM SERPL-SCNC: 4.3 MMOL/L — SIGNIFICANT CHANGE UP (ref 3.5–5.3)
POTASSIUM SERPL-SCNC: 4.5 MMOL/L — SIGNIFICANT CHANGE UP (ref 3.5–5.3)
PROT SERPL-MCNC: 7.8 G/DL — SIGNIFICANT CHANGE UP (ref 6–8.3)
PROTHROM AB SERPL-ACNC: 12.3 SEC — SIGNIFICANT CHANGE UP (ref 10.5–13.4)
RBC # BLD: 5.09 M/UL — SIGNIFICANT CHANGE UP (ref 4.2–5.8)
RBC # BLD: 5.71 M/UL — SIGNIFICANT CHANGE UP (ref 4.2–5.8)
RBC # FLD: 13.7 % — SIGNIFICANT CHANGE UP (ref 10.3–14.5)
RBC # FLD: 13.7 % — SIGNIFICANT CHANGE UP (ref 10.3–14.5)
SODIUM SERPL-SCNC: 136 MMOL/L — SIGNIFICANT CHANGE UP (ref 135–145)
SODIUM SERPL-SCNC: 140 MMOL/L — SIGNIFICANT CHANGE UP (ref 135–145)
TRIGL SERPL-MCNC: 284 MG/DL — HIGH
WBC # BLD: 6.09 K/UL — SIGNIFICANT CHANGE UP (ref 3.8–10.5)
WBC # BLD: 6.21 K/UL — SIGNIFICANT CHANGE UP (ref 3.8–10.5)
WBC # FLD AUTO: 6.09 K/UL — SIGNIFICANT CHANGE UP (ref 3.8–10.5)
WBC # FLD AUTO: 6.21 K/UL — SIGNIFICANT CHANGE UP (ref 3.8–10.5)

## 2022-11-29 PROCEDURE — 93005 ELECTROCARDIOGRAM TRACING: CPT

## 2022-11-29 PROCEDURE — 80061 LIPID PANEL: CPT

## 2022-11-29 PROCEDURE — 93454 CORONARY ARTERY ANGIO S&I: CPT | Mod: 59

## 2022-11-29 PROCEDURE — 83036 HEMOGLOBIN GLYCOSYLATED A1C: CPT

## 2022-11-29 PROCEDURE — 82553 CREATINE MB FRACTION: CPT

## 2022-11-29 PROCEDURE — 80048 BASIC METABOLIC PNL TOTAL CA: CPT

## 2022-11-29 PROCEDURE — 99152 MOD SED SAME PHYS/QHP 5/>YRS: CPT

## 2022-11-29 PROCEDURE — 80053 COMPREHEN METABOLIC PANEL: CPT

## 2022-11-29 PROCEDURE — 85730 THROMBOPLASTIN TIME PARTIAL: CPT

## 2022-11-29 PROCEDURE — 82962 GLUCOSE BLOOD TEST: CPT

## 2022-11-29 PROCEDURE — 85027 COMPLETE CBC AUTOMATED: CPT

## 2022-11-29 PROCEDURE — C9600: CPT | Mod: LC

## 2022-11-29 PROCEDURE — C1769: CPT

## 2022-11-29 PROCEDURE — 92928 PRQ TCAT PLMT NTRAC ST 1 LES: CPT | Mod: LC

## 2022-11-29 PROCEDURE — C1725: CPT

## 2022-11-29 PROCEDURE — 93010 ELECTROCARDIOGRAM REPORT: CPT | Mod: 76

## 2022-11-29 PROCEDURE — C1887: CPT

## 2022-11-29 PROCEDURE — 93454 CORONARY ARTERY ANGIO S&I: CPT | Mod: 26,59

## 2022-11-29 PROCEDURE — 85025 COMPLETE CBC W/AUTO DIFF WBC: CPT

## 2022-11-29 PROCEDURE — 85347 COAGULATION TIME ACTIVATED: CPT

## 2022-11-29 PROCEDURE — C1874: CPT

## 2022-11-29 PROCEDURE — 83735 ASSAY OF MAGNESIUM: CPT

## 2022-11-29 PROCEDURE — C1894: CPT

## 2022-11-29 PROCEDURE — 99153 MOD SED SAME PHYS/QHP EA: CPT

## 2022-11-29 PROCEDURE — 85610 PROTHROMBIN TIME: CPT

## 2022-11-29 PROCEDURE — 82550 ASSAY OF CK (CPK): CPT

## 2022-11-29 RX ORDER — DEXTROSE 50 % IN WATER 50 %
25 SYRINGE (ML) INTRAVENOUS ONCE
Refills: 0 | Status: DISCONTINUED | OUTPATIENT
Start: 2022-11-29 | End: 2022-12-13

## 2022-11-29 RX ORDER — INSULIN LISPRO 100/ML
VIAL (ML) SUBCUTANEOUS ONCE
Refills: 0 | Status: COMPLETED | OUTPATIENT
Start: 2022-11-29 | End: 2022-11-29

## 2022-11-29 RX ORDER — EZETIMIBE 10 MG/1
1 TABLET ORAL
Qty: 0 | Refills: 0 | DISCHARGE

## 2022-11-29 RX ORDER — SODIUM CHLORIDE 9 MG/ML
250 INJECTION INTRAMUSCULAR; INTRAVENOUS; SUBCUTANEOUS ONCE
Refills: 0 | Status: DISCONTINUED | OUTPATIENT
Start: 2022-11-29 | End: 2022-11-29

## 2022-11-29 RX ORDER — GLUCAGON INJECTION, SOLUTION 0.5 MG/.1ML
1 INJECTION, SOLUTION SUBCUTANEOUS ONCE
Refills: 0 | Status: DISCONTINUED | OUTPATIENT
Start: 2022-11-29 | End: 2022-12-13

## 2022-11-29 RX ORDER — CARVEDILOL PHOSPHATE 80 MG/1
1 CAPSULE, EXTENDED RELEASE ORAL
Qty: 0 | Refills: 0 | DISCHARGE

## 2022-11-29 RX ORDER — CLOPIDOGREL BISULFATE 75 MG/1
1 TABLET, FILM COATED ORAL
Qty: 30 | Refills: 11
Start: 2022-11-29 | End: 2023-11-23

## 2022-11-29 RX ORDER — SODIUM CHLORIDE 9 MG/ML
1000 INJECTION, SOLUTION INTRAVENOUS
Refills: 0 | Status: DISCONTINUED | OUTPATIENT
Start: 2022-11-29 | End: 2022-12-13

## 2022-11-29 RX ORDER — SODIUM CHLORIDE 9 MG/ML
500 INJECTION INTRAMUSCULAR; INTRAVENOUS; SUBCUTANEOUS
Refills: 0 | Status: DISCONTINUED | OUTPATIENT
Start: 2022-11-29 | End: 2022-11-29

## 2022-11-29 RX ORDER — ROSUVASTATIN CALCIUM 5 MG/1
1 TABLET ORAL
Qty: 0 | Refills: 0 | DISCHARGE

## 2022-11-29 RX ORDER — ATORVASTATIN CALCIUM 80 MG/1
1 TABLET, FILM COATED ORAL
Qty: 30 | Refills: 3
Start: 2022-11-29 | End: 2023-03-28

## 2022-11-29 RX ORDER — ASPIRIN/CALCIUM CARB/MAGNESIUM 324 MG
1 TABLET ORAL
Qty: 30 | Refills: 11
Start: 2022-11-29 | End: 2023-11-23

## 2022-11-29 RX ORDER — ASPIRIN/CALCIUM CARB/MAGNESIUM 324 MG
81 TABLET ORAL ONCE
Refills: 0 | Status: COMPLETED | OUTPATIENT
Start: 2022-11-29 | End: 2022-11-29

## 2022-11-29 RX ORDER — DEXTROSE 50 % IN WATER 50 %
12.5 SYRINGE (ML) INTRAVENOUS ONCE
Refills: 0 | Status: DISCONTINUED | OUTPATIENT
Start: 2022-11-29 | End: 2022-12-13

## 2022-11-29 RX ORDER — DEXTROSE 50 % IN WATER 50 %
15 SYRINGE (ML) INTRAVENOUS ONCE
Refills: 0 | Status: DISCONTINUED | OUTPATIENT
Start: 2022-11-29 | End: 2022-12-13

## 2022-11-29 RX ORDER — MAGNESIUM SULFATE 500 MG/ML
1 VIAL (ML) INJECTION ONCE
Refills: 0 | Status: COMPLETED | OUTPATIENT
Start: 2022-11-29 | End: 2022-11-29

## 2022-11-29 RX ORDER — SODIUM CHLORIDE 9 MG/ML
500 INJECTION INTRAMUSCULAR; INTRAVENOUS; SUBCUTANEOUS
Refills: 0 | Status: DISCONTINUED | OUTPATIENT
Start: 2022-11-29 | End: 2022-12-13

## 2022-11-29 RX ORDER — CLOPIDOGREL BISULFATE 75 MG/1
600 TABLET, FILM COATED ORAL ONCE
Refills: 0 | Status: COMPLETED | OUTPATIENT
Start: 2022-11-29 | End: 2022-11-29

## 2022-11-29 RX ORDER — PANTOPRAZOLE SODIUM 20 MG/1
1 TABLET, DELAYED RELEASE ORAL
Qty: 0 | Refills: 0 | DISCHARGE

## 2022-11-29 RX ADMIN — CLOPIDOGREL BISULFATE 600 MILLIGRAM(S): 75 TABLET, FILM COATED ORAL at 11:50

## 2022-11-29 RX ADMIN — Medication 100 GRAM(S): at 16:26

## 2022-11-29 RX ADMIN — SODIUM CHLORIDE 250 MILLILITER(S): 9 INJECTION INTRAMUSCULAR; INTRAVENOUS; SUBCUTANEOUS at 13:48

## 2022-11-29 RX ADMIN — SODIUM CHLORIDE 75 MILLILITER(S): 9 INJECTION INTRAMUSCULAR; INTRAVENOUS; SUBCUTANEOUS at 11:50

## 2022-11-29 RX ADMIN — Medication 81 MILLIGRAM(S): at 11:50

## 2022-11-29 NOTE — PROGRESS NOTE ADULT - SUBJECTIVE AND OBJECTIVE BOX
Interventional Cardiology PA Post PCI SDA Discharge Note      Patient without complaints. Ambulated and voided without difficulties    Afebrile, VSS    Ext:    		  		Right             Radial :  no  hematoma,  no   bleeding, dressing; C/D/I      Pulses:    intact RAD to baseline     A/P:  65 y/o male, current smoker, w/ PMHx HTN, HLD, Type II DM, CAD (s/p prior PCI, most recent cardiac cath 07/2020 w/ ANCA x 2 prox/mid LAD and prior prox/mid RCA stent patent), and BPH who presented to outpatient cardiologist, Dr. Beltran, endorsing exertional chest pain and dyspnea. Myocardial Perfusion (11/12/2022) revealed medium sized, reversible perfusion defect of moderate intensity involving the basal to mid inferior/inferolateral myocardial walls, small sized, reversible perfusion defect of mild to moderate intensity involving the apical anterior/anteroseptal myocardial walls, and EF 54%. Presents to [facility] for cardiac cath with intervention if indicated.     s/p cardiac catheterization 11/29/22: dRCA 30% w/ aneurysmal segment, p/mRCA patent stent w/ slow flow, RPDA mild diffuse disease, LM MLI, pLCx 70-80% s/p ANCA x1, mLCx aneurysmal, pLAD aneurysmal, pLAD patent stent w/ mild ISR, mLAD 30-40% ISR      1.	Follow-up with PMD/Cardiologist Ruby in 72 hours.  2.                 Post procedure labs/EKG reviewed and stable.    3.                 Pt given instructions on importance of taking antiplatelet medication.    4. 	Stable for discharge as per attending Dr. Beltran after bed rest, pt voids, groin/wrist stable and 30 minutes of ambulation.  5.                 Prescriptions for Aspirin/Plavix e-prescribed and submitted to patient's pharmacy.    6.                 Patient will continue Atorvastatin 40mg QD  7.	Discharge forms signed and copies in chart

## 2022-12-02 DIAGNOSIS — T82.855A STENOSIS OF CORONARY ARTERY STENT, INITIAL ENCOUNTER: ICD-10-CM

## 2022-12-02 DIAGNOSIS — I25.110 ATHEROSCLEROTIC HEART DISEASE OF NATIVE CORONARY ARTERY WITH UNSTABLE ANGINA PECTORIS: ICD-10-CM
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
## 2022-12-02 DIAGNOSIS — R94.39 ABNORMAL RESULT OF OTHER CARDIOVASCULAR FUNCTION STUDY: ICD-10-CM

## 2023-11-14 VITALS
WEIGHT: 216.93 LBS | HEART RATE: 64 BPM | RESPIRATION RATE: 14 BRPM | HEIGHT: 70 IN | SYSTOLIC BLOOD PRESSURE: 125 MMHG | DIASTOLIC BLOOD PRESSURE: 87 MMHG

## 2023-11-14 RX ORDER — CHLORHEXIDINE GLUCONATE 213 G/1000ML
1 SOLUTION TOPICAL ONCE
Refills: 0 | Status: DISCONTINUED | OUTPATIENT
Start: 2023-11-15 | End: 2023-11-29

## 2023-11-14 RX ORDER — CLOPIDOGREL BISULFATE 75 MG/1
75 TABLET, FILM COATED ORAL DAILY
Refills: 0 | Status: DISCONTINUED | OUTPATIENT
Start: 2023-11-15 | End: 2023-11-29

## 2023-11-14 NOTE — H&P ADULT - ASSESSMENT
EKG: 	SR at 65 bpm, no TWI, no ischemia		  ASA 	III  Mallampati class: II  Anginal Class: II    -No Known Allergies    -H/H = 13.9/42.9    - Pt denies BRBPR, hematuria, hematochezia, melena. Pt endorses compliance w/ home Aspirin 81mg  and Plavix 75 mg daily stating last dose was 11/14/23. Pt loaded w/ ASA 81 mg x1 and Plavix 75 mg x1.    - Magnesium 800 mg x 1 ordered pre cath as pt  Mag 1.5 on admission. 1g IVPB Mag ordered for post cath.    - BUN/Cr = 24/1.34. Dr. Beltran made aware.     - EF 61%. Euvolemic on exam. IV NS @ 250 cc bolus followed by 75 cc/hr x  2 hrs started pre procedure    Sedation Plan:   Moderate  Patient Is Suitable Candidate For Sedation?     Yes    Risks & benefits of procedure and alternative therapy have been explained to the patient including but not limited to: allergic reaction, bleeding with possible need for blood transfusion, infection, renal and vascular compromise, limb damage, arrhythmia, stroke, vessel dissection/perforation, myocardial infarction, and emergent CABG. Informed consent obtained at bedside and included in chart.

## 2023-11-14 NOTE — H&P ADULT - HISTORY OF PRESENT ILLNESS
Cardiologist: Dr. Beltrna   Pharmacy: [facility] [phone] [address] [place] [address] (CONFIRM)  Escort:     Verify meds w pt*    68 y/o male, current smoker, w/ PMHx HTN, HLD, Type II DM, CAD s/p prior PCIs (reports below), BPH who presented to outpatient cardiologist, Dr. Beltran, endorsing chest pain symptoms ______. Patient denies any dizziness, diaphoresis, syncope, palpitations, abdominal pain, nausea/vomiting, melena, LE edema, fever, chills, and cough. Myocardial Perfusion Stress (11/11/2023) revealed medium-large size reversible perfusion defect of moderate intensity involving entire (basal-apical) inferior/inferolateral walls suggestive of inducible myocardial ischemia in posterior coronary circulation distribution, EF 61%.     In light of patient's risk factors, CCS Class III anginal symptoms, and abnormal Myocardial Perfusion results, patient now presents for cardiac catheterization w/ possible intervention if clinically indicated.     Prior cardiac testing:   [facility] w/ Dr. Beltran @ [facility] 11/29/22:pLCx 70-80% s/p ANCA x1, dRCA 30% w/ aneurysmal segment, p/mRCA patent stent w/ slow flow, RPDA mild diffuse disease, LM MLI, mLCx aneurysmal, pLAD aneurysmal, pLAD patent stent w/ mild ISR, mLAD 30-40% ISR.     [facility] w/ Ruby Gastelum @ [facility] 7/1/20: s/p ANCA to pLAD & ANCA to mLAD. LM wnl, pLAD w/ aneurysmal changes, mLAD 90%, dLAD 80%, LCx aneurysmal dilation of prox and mid segments with 60% stenosis of proximal segment at takeoff of OM1, previously placed stent at p-mRCA widely patent.     Cardiologist: Dr. Beltran   Pharmacy: [facility] [phone] [address] [place] [address]   Escort: [place]    68 y/o male, current smoker, w/ PMHx HTN, HLD, Type II DM, CAD s/p prior PCIs (reports below), BPH who presented to outpatient cardiologist, Dr. Beltran, endorsing intermittent b/l shoulder chest pain rated 5/10 with associated SOB. Patient denies any dizziness, diaphoresis, syncope, palpitations, abdominal pain, nausea/vomiting, melena, LE edema, fever, chills, and cough. Myocardial Perfusion Stress (11/11/2023) revealed medium-large size reversible perfusion defect of moderate intensity involving entire (basal-apical) inferior/inferolateral walls suggestive of inducible myocardial ischemia in posterior coronary circulation distribution, EF 61%.     In light of patient's risk factors, CCS Class III anginal symptoms, and abnormal Myocardial Perfusion results, patient now presents for cardiac catheterization w/ possible intervention if clinically indicated.     Prior cardiac testing:   [facility] w/ Dr. Beltran @ [facility] 11/29/22:pLCx 70-80% s/p ANCA x1, dRCA 30% w/ aneurysmal segment, p/mRCA patent stent w/ slow flow, RPDA mild diffuse disease, LM MLI, mLCx aneurysmal, pLAD aneurysmal, pLAD patent stent w/ mild ISR, mLAD 30-40% ISR.     [facility] w/ Ruby Gastelum @ [facility] 7/1/20: s/p ANCA to pLAD & ANCA to mLAD. LM wnl, pLAD w/ aneurysmal changes, mLAD 90%, dLAD 80%, LCx aneurysmal dilation of prox and mid segments with 60% stenosis of proximal segment at takeoff of OM1, previously placed stent at p-mRCA widely patent.

## 2023-11-14 NOTE — H&P ADULT - NSHPLABSRESULTS_GEN_ALL_CORE
13.9   6.13  )-----------( 188      ( 15 Nov 2023 10:49 )             42.9       11-15    143  |  107  |  24<H>  ----------------------------<  138<H>  5.0   |  27  |  1.34<H>    Ca    9.6      15 Nov 2023 11:09  Mg     1.5     11-15    TPro  7.4  /  Alb  4.1  /  TBili  0.4  /  DBili  x   /  AST  17  /  ALT  25  /  AlkPhos  65  11-15      PT/INR - ( 15 Nov 2023 10:49 )   PT: 10.7 sec;   INR: 0.94          PTT - ( 15 Nov 2023 10:49 )  PTT:30.5 sec    CARDIAC MARKERS ( 15 Nov 2023 11:09 )  x     / x     / 164 U/L / x     / 3.7 ng/mL        Urinalysis Basic - ( 15 Nov 2023 11:09 )    Color: x / Appearance: x / SG: x / pH: x  Gluc: 138 mg/dL / Ketone: x  / Bili: x / Urobili: x   Blood: x / Protein: x / Nitrite: x   Leuk Esterase: x / RBC: x / WBC x   Sq Epi: x / Non Sq Epi: x / Bacteria: x        EKG:SR at 65 bpm, no TWI, no ischemia

## 2023-11-15 ENCOUNTER — OUTPATIENT (OUTPATIENT)
Dept: OUTPATIENT SERVICES | Facility: HOSPITAL | Age: 68
LOS: 1 days | Discharge: HOME | End: 2023-11-15
Payer: MEDICARE

## 2023-11-15 DIAGNOSIS — Z90.09 ACQUIRED ABSENCE OF OTHER PART OF HEAD AND NECK: Chronic | ICD-10-CM

## 2023-11-15 LAB
A1C WITH ESTIMATED AVERAGE GLUCOSE RESULT: 8 % — HIGH (ref 4–5.6)
A1C WITH ESTIMATED AVERAGE GLUCOSE RESULT: 8 % — HIGH (ref 4–5.6)
ALBUMIN SERPL ELPH-MCNC: 4.1 G/DL — SIGNIFICANT CHANGE UP (ref 3.3–5)
ALBUMIN SERPL ELPH-MCNC: 4.1 G/DL — SIGNIFICANT CHANGE UP (ref 3.3–5)
ALP SERPL-CCNC: 65 U/L — SIGNIFICANT CHANGE UP (ref 40–120)
ALP SERPL-CCNC: 65 U/L — SIGNIFICANT CHANGE UP (ref 40–120)
ALT FLD-CCNC: 25 U/L — SIGNIFICANT CHANGE UP (ref 10–45)
ALT FLD-CCNC: 25 U/L — SIGNIFICANT CHANGE UP (ref 10–45)
ANION GAP SERPL CALC-SCNC: 9 MMOL/L — SIGNIFICANT CHANGE UP (ref 5–17)
APTT BLD: 30.5 SEC — SIGNIFICANT CHANGE UP (ref 24.5–35.6)
APTT BLD: 30.5 SEC — SIGNIFICANT CHANGE UP (ref 24.5–35.6)
AST SERPL-CCNC: 17 U/L — SIGNIFICANT CHANGE UP (ref 10–40)
AST SERPL-CCNC: 17 U/L — SIGNIFICANT CHANGE UP (ref 10–40)
BASOPHILS # BLD AUTO: 0.05 K/UL — SIGNIFICANT CHANGE UP (ref 0–0.2)
BASOPHILS NFR BLD AUTO: 0.8 % — SIGNIFICANT CHANGE UP (ref 0–2)
BASOPHILS NFR BLD AUTO: 0.8 % — SIGNIFICANT CHANGE UP (ref 0–2)
BASOPHILS NFR BLD AUTO: 0.9 % — SIGNIFICANT CHANGE UP (ref 0–2)
BASOPHILS NFR BLD AUTO: 0.9 % — SIGNIFICANT CHANGE UP (ref 0–2)
BILIRUB SERPL-MCNC: 0.4 MG/DL — SIGNIFICANT CHANGE UP (ref 0.2–1.2)
BILIRUB SERPL-MCNC: 0.4 MG/DL — SIGNIFICANT CHANGE UP (ref 0.2–1.2)
BUN SERPL-MCNC: 21 MG/DL — SIGNIFICANT CHANGE UP (ref 7–23)
BUN SERPL-MCNC: 21 MG/DL — SIGNIFICANT CHANGE UP (ref 7–23)
BUN SERPL-MCNC: 24 MG/DL — HIGH (ref 7–23)
BUN SERPL-MCNC: 24 MG/DL — HIGH (ref 7–23)
CALCIUM SERPL-MCNC: 8.8 MG/DL — SIGNIFICANT CHANGE UP (ref 8.4–10.5)
CALCIUM SERPL-MCNC: 8.8 MG/DL — SIGNIFICANT CHANGE UP (ref 8.4–10.5)
CALCIUM SERPL-MCNC: 9.6 MG/DL — SIGNIFICANT CHANGE UP (ref 8.4–10.5)
CALCIUM SERPL-MCNC: 9.6 MG/DL — SIGNIFICANT CHANGE UP (ref 8.4–10.5)
CHLORIDE SERPL-SCNC: 106 MMOL/L — SIGNIFICANT CHANGE UP (ref 96–108)
CHLORIDE SERPL-SCNC: 106 MMOL/L — SIGNIFICANT CHANGE UP (ref 96–108)
CHLORIDE SERPL-SCNC: 107 MMOL/L — SIGNIFICANT CHANGE UP (ref 96–108)
CHLORIDE SERPL-SCNC: 107 MMOL/L — SIGNIFICANT CHANGE UP (ref 96–108)
CHOLEST SERPL-MCNC: 194 MG/DL — SIGNIFICANT CHANGE UP
CHOLEST SERPL-MCNC: 194 MG/DL — SIGNIFICANT CHANGE UP
CK MB CFR SERPL CALC: 3.7 NG/ML — SIGNIFICANT CHANGE UP (ref 0–6.7)
CK MB CFR SERPL CALC: 3.7 NG/ML — SIGNIFICANT CHANGE UP (ref 0–6.7)
CK SERPL-CCNC: 164 U/L — SIGNIFICANT CHANGE UP (ref 30–200)
CK SERPL-CCNC: 164 U/L — SIGNIFICANT CHANGE UP (ref 30–200)
CO2 SERPL-SCNC: 24 MMOL/L — SIGNIFICANT CHANGE UP (ref 22–31)
CO2 SERPL-SCNC: 24 MMOL/L — SIGNIFICANT CHANGE UP (ref 22–31)
CO2 SERPL-SCNC: 27 MMOL/L — SIGNIFICANT CHANGE UP (ref 22–31)
CO2 SERPL-SCNC: 27 MMOL/L — SIGNIFICANT CHANGE UP (ref 22–31)
CREAT SERPL-MCNC: 1.2 MG/DL — SIGNIFICANT CHANGE UP (ref 0.5–1.3)
CREAT SERPL-MCNC: 1.2 MG/DL — SIGNIFICANT CHANGE UP (ref 0.5–1.3)
CREAT SERPL-MCNC: 1.34 MG/DL — HIGH (ref 0.5–1.3)
CREAT SERPL-MCNC: 1.34 MG/DL — HIGH (ref 0.5–1.3)
EGFR: 58 ML/MIN/1.73M2 — LOW
EGFR: 58 ML/MIN/1.73M2 — LOW
EGFR: 67 ML/MIN/1.73M2 — SIGNIFICANT CHANGE UP
EGFR: 67 ML/MIN/1.73M2 — SIGNIFICANT CHANGE UP
EOSINOPHIL # BLD AUTO: 0.11 K/UL — SIGNIFICANT CHANGE UP (ref 0–0.5)
EOSINOPHIL # BLD AUTO: 0.11 K/UL — SIGNIFICANT CHANGE UP (ref 0–0.5)
EOSINOPHIL # BLD AUTO: 0.13 K/UL — SIGNIFICANT CHANGE UP (ref 0–0.5)
EOSINOPHIL # BLD AUTO: 0.13 K/UL — SIGNIFICANT CHANGE UP (ref 0–0.5)
EOSINOPHIL NFR BLD AUTO: 1.8 % — SIGNIFICANT CHANGE UP (ref 0–6)
EOSINOPHIL NFR BLD AUTO: 1.8 % — SIGNIFICANT CHANGE UP (ref 0–6)
EOSINOPHIL NFR BLD AUTO: 2.3 % — SIGNIFICANT CHANGE UP (ref 0–6)
EOSINOPHIL NFR BLD AUTO: 2.3 % — SIGNIFICANT CHANGE UP (ref 0–6)
ESTIMATED AVERAGE GLUCOSE: 183 MG/DL — HIGH (ref 68–114)
ESTIMATED AVERAGE GLUCOSE: 183 MG/DL — HIGH (ref 68–114)
GLUCOSE BLDC GLUCOMTR-MCNC: 113 MG/DL — HIGH (ref 70–99)
GLUCOSE BLDC GLUCOMTR-MCNC: 113 MG/DL — HIGH (ref 70–99)
GLUCOSE SERPL-MCNC: 138 MG/DL — HIGH (ref 70–99)
GLUCOSE SERPL-MCNC: 138 MG/DL — HIGH (ref 70–99)
GLUCOSE SERPL-MCNC: 146 MG/DL — HIGH (ref 70–99)
GLUCOSE SERPL-MCNC: 146 MG/DL — HIGH (ref 70–99)
HCT VFR BLD CALC: 40.2 % — SIGNIFICANT CHANGE UP (ref 39–50)
HCT VFR BLD CALC: 40.2 % — SIGNIFICANT CHANGE UP (ref 39–50)
HCT VFR BLD CALC: 42.9 % — SIGNIFICANT CHANGE UP (ref 39–50)
HCT VFR BLD CALC: 42.9 % — SIGNIFICANT CHANGE UP (ref 39–50)
HDLC SERPL-MCNC: 28 MG/DL — LOW
HDLC SERPL-MCNC: 28 MG/DL — LOW
HGB BLD-MCNC: 13.1 G/DL — SIGNIFICANT CHANGE UP (ref 13–17)
HGB BLD-MCNC: 13.1 G/DL — SIGNIFICANT CHANGE UP (ref 13–17)
HGB BLD-MCNC: 13.9 G/DL — SIGNIFICANT CHANGE UP (ref 13–17)
HGB BLD-MCNC: 13.9 G/DL — SIGNIFICANT CHANGE UP (ref 13–17)
IMM GRANULOCYTES NFR BLD AUTO: 0.5 % — SIGNIFICANT CHANGE UP (ref 0–0.9)
IMM GRANULOCYTES NFR BLD AUTO: 0.5 % — SIGNIFICANT CHANGE UP (ref 0–0.9)
IMM GRANULOCYTES NFR BLD AUTO: 0.7 % — SIGNIFICANT CHANGE UP (ref 0–0.9)
IMM GRANULOCYTES NFR BLD AUTO: 0.7 % — SIGNIFICANT CHANGE UP (ref 0–0.9)
INR BLD: 0.94 — SIGNIFICANT CHANGE UP (ref 0.85–1.18)
INR BLD: 0.94 — SIGNIFICANT CHANGE UP (ref 0.85–1.18)
LIPID PNL WITH DIRECT LDL SERPL: 116 MG/DL — HIGH
LIPID PNL WITH DIRECT LDL SERPL: 116 MG/DL — HIGH
LYMPHOCYTES # BLD AUTO: 2.28 K/UL — SIGNIFICANT CHANGE UP (ref 1–3.3)
LYMPHOCYTES # BLD AUTO: 2.28 K/UL — SIGNIFICANT CHANGE UP (ref 1–3.3)
LYMPHOCYTES # BLD AUTO: 2.51 K/UL — SIGNIFICANT CHANGE UP (ref 1–3.3)
LYMPHOCYTES # BLD AUTO: 2.51 K/UL — SIGNIFICANT CHANGE UP (ref 1–3.3)
LYMPHOCYTES # BLD AUTO: 37.2 % — SIGNIFICANT CHANGE UP (ref 13–44)
LYMPHOCYTES # BLD AUTO: 37.2 % — SIGNIFICANT CHANGE UP (ref 13–44)
LYMPHOCYTES # BLD AUTO: 43.6 % — SIGNIFICANT CHANGE UP (ref 13–44)
LYMPHOCYTES # BLD AUTO: 43.6 % — SIGNIFICANT CHANGE UP (ref 13–44)
MAGNESIUM SERPL-MCNC: 1.5 MG/DL — LOW (ref 1.6–2.6)
MCHC RBC-ENTMCNC: 26.6 PG — LOW (ref 27–34)
MCHC RBC-ENTMCNC: 26.6 PG — LOW (ref 27–34)
MCHC RBC-ENTMCNC: 26.7 PG — LOW (ref 27–34)
MCHC RBC-ENTMCNC: 26.7 PG — LOW (ref 27–34)
MCHC RBC-ENTMCNC: 32.4 GM/DL — SIGNIFICANT CHANGE UP (ref 32–36)
MCHC RBC-ENTMCNC: 32.4 GM/DL — SIGNIFICANT CHANGE UP (ref 32–36)
MCHC RBC-ENTMCNC: 32.6 GM/DL — SIGNIFICANT CHANGE UP (ref 32–36)
MCHC RBC-ENTMCNC: 32.6 GM/DL — SIGNIFICANT CHANGE UP (ref 32–36)
MCV RBC AUTO: 81.5 FL — SIGNIFICANT CHANGE UP (ref 80–100)
MCV RBC AUTO: 81.5 FL — SIGNIFICANT CHANGE UP (ref 80–100)
MCV RBC AUTO: 82.5 FL — SIGNIFICANT CHANGE UP (ref 80–100)
MCV RBC AUTO: 82.5 FL — SIGNIFICANT CHANGE UP (ref 80–100)
MONOCYTES # BLD AUTO: 0.41 K/UL — SIGNIFICANT CHANGE UP (ref 0–0.9)
MONOCYTES # BLD AUTO: 0.41 K/UL — SIGNIFICANT CHANGE UP (ref 0–0.9)
MONOCYTES # BLD AUTO: 0.48 K/UL — SIGNIFICANT CHANGE UP (ref 0–0.9)
MONOCYTES # BLD AUTO: 0.48 K/UL — SIGNIFICANT CHANGE UP (ref 0–0.9)
MONOCYTES NFR BLD AUTO: 7.1 % — SIGNIFICANT CHANGE UP (ref 2–14)
MONOCYTES NFR BLD AUTO: 7.1 % — SIGNIFICANT CHANGE UP (ref 2–14)
MONOCYTES NFR BLD AUTO: 7.8 % — SIGNIFICANT CHANGE UP (ref 2–14)
MONOCYTES NFR BLD AUTO: 7.8 % — SIGNIFICANT CHANGE UP (ref 2–14)
NEUTROPHILS # BLD AUTO: 2.62 K/UL — SIGNIFICANT CHANGE UP (ref 1.8–7.4)
NEUTROPHILS # BLD AUTO: 2.62 K/UL — SIGNIFICANT CHANGE UP (ref 1.8–7.4)
NEUTROPHILS # BLD AUTO: 3.18 K/UL — SIGNIFICANT CHANGE UP (ref 1.8–7.4)
NEUTROPHILS # BLD AUTO: 3.18 K/UL — SIGNIFICANT CHANGE UP (ref 1.8–7.4)
NEUTROPHILS NFR BLD AUTO: 45.4 % — SIGNIFICANT CHANGE UP (ref 43–77)
NEUTROPHILS NFR BLD AUTO: 45.4 % — SIGNIFICANT CHANGE UP (ref 43–77)
NEUTROPHILS NFR BLD AUTO: 51.9 % — SIGNIFICANT CHANGE UP (ref 43–77)
NEUTROPHILS NFR BLD AUTO: 51.9 % — SIGNIFICANT CHANGE UP (ref 43–77)
NON HDL CHOLESTEROL: 166 MG/DL — HIGH
NON HDL CHOLESTEROL: 166 MG/DL — HIGH
NRBC # BLD: 0 /100 WBCS — SIGNIFICANT CHANGE UP (ref 0–0)
PLATELET # BLD AUTO: 182 K/UL — SIGNIFICANT CHANGE UP (ref 150–400)
PLATELET # BLD AUTO: 182 K/UL — SIGNIFICANT CHANGE UP (ref 150–400)
PLATELET # BLD AUTO: 188 K/UL — SIGNIFICANT CHANGE UP (ref 150–400)
PLATELET # BLD AUTO: 188 K/UL — SIGNIFICANT CHANGE UP (ref 150–400)
POTASSIUM SERPL-MCNC: 4.4 MMOL/L — SIGNIFICANT CHANGE UP (ref 3.5–5.3)
POTASSIUM SERPL-MCNC: 4.4 MMOL/L — SIGNIFICANT CHANGE UP (ref 3.5–5.3)
POTASSIUM SERPL-MCNC: 5 MMOL/L — SIGNIFICANT CHANGE UP (ref 3.5–5.3)
POTASSIUM SERPL-MCNC: 5 MMOL/L — SIGNIFICANT CHANGE UP (ref 3.5–5.3)
POTASSIUM SERPL-SCNC: 4.4 MMOL/L — SIGNIFICANT CHANGE UP (ref 3.5–5.3)
POTASSIUM SERPL-SCNC: 4.4 MMOL/L — SIGNIFICANT CHANGE UP (ref 3.5–5.3)
POTASSIUM SERPL-SCNC: 5 MMOL/L — SIGNIFICANT CHANGE UP (ref 3.5–5.3)
POTASSIUM SERPL-SCNC: 5 MMOL/L — SIGNIFICANT CHANGE UP (ref 3.5–5.3)
PROT SERPL-MCNC: 7.4 G/DL — SIGNIFICANT CHANGE UP (ref 6–8.3)
PROT SERPL-MCNC: 7.4 G/DL — SIGNIFICANT CHANGE UP (ref 6–8.3)
PROTHROM AB SERPL-ACNC: 10.7 SEC — SIGNIFICANT CHANGE UP (ref 9.5–13)
PROTHROM AB SERPL-ACNC: 10.7 SEC — SIGNIFICANT CHANGE UP (ref 9.5–13)
RBC # BLD: 4.93 M/UL — SIGNIFICANT CHANGE UP (ref 4.2–5.8)
RBC # BLD: 4.93 M/UL — SIGNIFICANT CHANGE UP (ref 4.2–5.8)
RBC # BLD: 5.2 M/UL — SIGNIFICANT CHANGE UP (ref 4.2–5.8)
RBC # BLD: 5.2 M/UL — SIGNIFICANT CHANGE UP (ref 4.2–5.8)
RBC # FLD: 14 % — SIGNIFICANT CHANGE UP (ref 10.3–14.5)
RBC # FLD: 14 % — SIGNIFICANT CHANGE UP (ref 10.3–14.5)
RBC # FLD: 14.1 % — SIGNIFICANT CHANGE UP (ref 10.3–14.5)
RBC # FLD: 14.1 % — SIGNIFICANT CHANGE UP (ref 10.3–14.5)
SODIUM SERPL-SCNC: 139 MMOL/L — SIGNIFICANT CHANGE UP (ref 135–145)
SODIUM SERPL-SCNC: 139 MMOL/L — SIGNIFICANT CHANGE UP (ref 135–145)
SODIUM SERPL-SCNC: 143 MMOL/L — SIGNIFICANT CHANGE UP (ref 135–145)
SODIUM SERPL-SCNC: 143 MMOL/L — SIGNIFICANT CHANGE UP (ref 135–145)
TRIGL SERPL-MCNC: 252 MG/DL — HIGH
TRIGL SERPL-MCNC: 252 MG/DL — HIGH
WBC # BLD: 5.76 K/UL — SIGNIFICANT CHANGE UP (ref 3.8–10.5)
WBC # BLD: 5.76 K/UL — SIGNIFICANT CHANGE UP (ref 3.8–10.5)
WBC # BLD: 6.13 K/UL — SIGNIFICANT CHANGE UP (ref 3.8–10.5)
WBC # BLD: 6.13 K/UL — SIGNIFICANT CHANGE UP (ref 3.8–10.5)
WBC # FLD AUTO: 5.76 K/UL — SIGNIFICANT CHANGE UP (ref 3.8–10.5)
WBC # FLD AUTO: 5.76 K/UL — SIGNIFICANT CHANGE UP (ref 3.8–10.5)
WBC # FLD AUTO: 6.13 K/UL — SIGNIFICANT CHANGE UP (ref 3.8–10.5)
WBC # FLD AUTO: 6.13 K/UL — SIGNIFICANT CHANGE UP (ref 3.8–10.5)

## 2023-11-15 PROCEDURE — 36415 COLL VENOUS BLD VENIPUNCTURE: CPT

## 2023-11-15 PROCEDURE — 93454 CORONARY ARTERY ANGIO S&I: CPT | Mod: 26,59

## 2023-11-15 PROCEDURE — 80061 LIPID PANEL: CPT

## 2023-11-15 PROCEDURE — 93010 ELECTROCARDIOGRAM REPORT: CPT

## 2023-11-15 PROCEDURE — 85730 THROMBOPLASTIN TIME PARTIAL: CPT

## 2023-11-15 PROCEDURE — 85025 COMPLETE CBC W/AUTO DIFF WBC: CPT

## 2023-11-15 PROCEDURE — 85610 PROTHROMBIN TIME: CPT

## 2023-11-15 PROCEDURE — C1887: CPT

## 2023-11-15 PROCEDURE — 92920 PRQ TRLUML C ANGIOP 1ART&/BR: CPT | Mod: LD

## 2023-11-15 PROCEDURE — 99152 MOD SED SAME PHYS/QHP 5/>YRS: CPT

## 2023-11-15 PROCEDURE — 82550 ASSAY OF CK (CPK): CPT

## 2023-11-15 PROCEDURE — 93454 CORONARY ARTERY ANGIO S&I: CPT | Mod: 59

## 2023-11-15 PROCEDURE — 82553 CREATINE MB FRACTION: CPT

## 2023-11-15 PROCEDURE — 93005 ELECTROCARDIOGRAM TRACING: CPT

## 2023-11-15 PROCEDURE — 82962 GLUCOSE BLOOD TEST: CPT

## 2023-11-15 PROCEDURE — 83036 HEMOGLOBIN GLYCOSYLATED A1C: CPT

## 2023-11-15 PROCEDURE — 83735 ASSAY OF MAGNESIUM: CPT

## 2023-11-15 PROCEDURE — 80048 BASIC METABOLIC PNL TOTAL CA: CPT

## 2023-11-15 PROCEDURE — C1725: CPT

## 2023-11-15 PROCEDURE — 85347 COAGULATION TIME ACTIVATED: CPT

## 2023-11-15 PROCEDURE — 80053 COMPREHEN METABOLIC PANEL: CPT

## 2023-11-15 PROCEDURE — C1894: CPT

## 2023-11-15 PROCEDURE — C1769: CPT

## 2023-11-15 RX ORDER — TAMSULOSIN HYDROCHLORIDE 0.4 MG/1
1 CAPSULE ORAL
Qty: 0 | Refills: 0 | DISCHARGE

## 2023-11-15 RX ORDER — ASPIRIN/CALCIUM CARB/MAGNESIUM 324 MG
1 TABLET ORAL
Qty: 0 | Refills: 0 | DISCHARGE

## 2023-11-15 RX ORDER — ASPIRIN/CALCIUM CARB/MAGNESIUM 324 MG
81 TABLET ORAL ONCE
Refills: 0 | Status: COMPLETED | OUTPATIENT
Start: 2023-11-15 | End: 2023-11-15

## 2023-11-15 RX ORDER — CLOPIDOGREL BISULFATE 75 MG/1
1 TABLET, FILM COATED ORAL
Qty: 30 | Refills: 11
Start: 2023-11-15 | End: 2024-11-08

## 2023-11-15 RX ORDER — ALOGLIPTIN 12.5 MG/1
1 TABLET, FILM COATED ORAL
Refills: 0 | DISCHARGE

## 2023-11-15 RX ORDER — ASPIRIN/CALCIUM CARB/MAGNESIUM 324 MG
1 TABLET ORAL
Qty: 30 | Refills: 11
Start: 2023-11-15 | End: 2024-11-08

## 2023-11-15 RX ORDER — PANTOPRAZOLE SODIUM 20 MG/1
1 TABLET, DELAYED RELEASE ORAL
Qty: 0 | Refills: 0 | DISCHARGE

## 2023-11-15 RX ORDER — MAGNESIUM SULFATE 500 MG/ML
1 VIAL (ML) INJECTION ONCE
Refills: 0 | Status: COMPLETED | OUTPATIENT
Start: 2023-11-15 | End: 2023-11-15

## 2023-11-15 RX ORDER — MAGNESIUM OXIDE 400 MG ORAL TABLET 241.3 MG
800 TABLET ORAL ONCE
Refills: 0 | Status: COMPLETED | OUTPATIENT
Start: 2023-11-15 | End: 2023-11-15

## 2023-11-15 RX ORDER — MAGNESIUM OXIDE 400 MG ORAL TABLET 241.3 MG
400 TABLET ORAL ONCE
Refills: 0 | Status: COMPLETED | OUTPATIENT
Start: 2023-11-15 | End: 2023-11-15

## 2023-11-15 RX ORDER — LOSARTAN POTASSIUM 100 MG/1
1 TABLET, FILM COATED ORAL
Qty: 0 | Refills: 0 | DISCHARGE

## 2023-11-15 RX ORDER — ALOGLIPTIN AND METFORMIN HYDROCHLORIDE 12.5; 5 MG/1; MG/1
1 TABLET, FILM COATED ORAL
Qty: 0 | Refills: 0 | DISCHARGE

## 2023-11-15 RX ORDER — METFORMIN HYDROCHLORIDE 850 MG/1
1 TABLET ORAL
Refills: 0 | DISCHARGE

## 2023-11-15 RX ORDER — SODIUM CHLORIDE 9 MG/ML
500 INJECTION INTRAMUSCULAR; INTRAVENOUS; SUBCUTANEOUS
Refills: 0 | Status: DISCONTINUED | OUTPATIENT
Start: 2023-11-15 | End: 2023-11-29

## 2023-11-15 RX ORDER — SODIUM CHLORIDE 9 MG/ML
1000 INJECTION INTRAMUSCULAR; INTRAVENOUS; SUBCUTANEOUS
Refills: 0 | Status: COMPLETED | OUTPATIENT
Start: 2023-11-15 | End: 2023-11-15

## 2023-11-15 RX ORDER — SODIUM CHLORIDE 9 MG/ML
250 INJECTION INTRAMUSCULAR; INTRAVENOUS; SUBCUTANEOUS ONCE
Refills: 0 | Status: COMPLETED | OUTPATIENT
Start: 2023-11-15 | End: 2023-11-15

## 2023-11-15 RX ORDER — LOSARTAN POTASSIUM 100 MG/1
50 TABLET, FILM COATED ORAL ONCE
Refills: 0 | Status: COMPLETED | OUTPATIENT
Start: 2023-11-15 | End: 2023-11-15

## 2023-11-15 RX ORDER — LOSARTAN POTASSIUM 100 MG/1
1 TABLET, FILM COATED ORAL
Refills: 0 | DISCHARGE

## 2023-11-15 RX ADMIN — Medication 81 MILLIGRAM(S): at 11:49

## 2023-11-15 RX ADMIN — SODIUM CHLORIDE 250 MILLILITER(S): 9 INJECTION INTRAMUSCULAR; INTRAVENOUS; SUBCUTANEOUS at 13:40

## 2023-11-15 RX ADMIN — SODIUM CHLORIDE 75 MILLILITER(S): 9 INJECTION INTRAMUSCULAR; INTRAVENOUS; SUBCUTANEOUS at 13:56

## 2023-11-15 RX ADMIN — CLOPIDOGREL BISULFATE 75 MILLIGRAM(S): 75 TABLET, FILM COATED ORAL at 11:49

## 2023-11-15 RX ADMIN — SODIUM CHLORIDE 75 MILLILITER(S): 9 INJECTION INTRAMUSCULAR; INTRAVENOUS; SUBCUTANEOUS at 11:54

## 2023-11-15 RX ADMIN — MAGNESIUM OXIDE 400 MG ORAL TABLET 800 MILLIGRAM(S): 241.3 TABLET ORAL at 11:58

## 2023-11-15 RX ADMIN — SODIUM CHLORIDE 1000 MILLILITER(S): 9 INJECTION INTRAMUSCULAR; INTRAVENOUS; SUBCUTANEOUS at 11:50

## 2023-11-15 RX ADMIN — LOSARTAN POTASSIUM 50 MILLIGRAM(S): 100 TABLET, FILM COATED ORAL at 15:35

## 2023-11-15 RX ADMIN — Medication 100 GRAM(S): at 12:01

## 2023-11-15 RX ADMIN — MAGNESIUM OXIDE 400 MG ORAL TABLET 400 MILLIGRAM(S): 241.3 TABLET ORAL at 17:17

## 2023-11-15 NOTE — PROGRESS NOTE ADULT - SUBJECTIVE AND OBJECTIVE BOX
Interventional Cardiology PA Post PCI SDA Discharge Note      Patient without complaints. Ambulated and voided without difficulties    Afebrile, VSS    Ext:    Right  Radial: no hematoma, no bleeding, dressing; C/D/I      Pulses:    intact RAD to baseline     A/P:    68 y/o male, current smoker, w/ PMHx HTN, HLD, Type II DM, CAD s/p prior PCIs (reports below), BPH who presented to outpatient cardiologist, Dr. Beltran, endorsing intermittent b/l shoulder chest pain rated 5/10 with associated SOB. Patient denies any dizziness, diaphoresis, syncope, palpitations, abdominal pain, nausea/vomiting, melena, LE edema, fever, chills, and cough. Myocardial Perfusion Stress (11/11/2023) revealed medium-large size reversible perfusion defect of moderate intensity involving entire (basal-apical) inferior/inferolateral walls suggestive of inducible myocardial ischemia in posterior coronary circulation distribution, EF 61%.     In light of patient's risk factors, CCS Class III anginal symptoms, and abnormal Myocardial Perfusion results, patient now presents for cardiac catheterization w/ possible intervention if clinically indicated.     s/p cardiac cath 11/15/23: LM nml; mlAD 70-80% ISR -> PTCA x1; LCA patent stents ectatic; RCA patent stents wtih slow flow; normal EDP  Right radial access    1.  Follow-up with PMD/Cardiologist Dr. Beltran in 72 hours.  2.  Post procedure labs/EKG reviewed and stable.    3.  Pt given instructions on importance of taking antiplatelet medication.    4.  Stable for discharge as per attending Dr. Beltran after bed rest, pt voids, wrist stable and 30 minutes of ambulation.  5.  Prescriptions for Aspirin/Plavix e-prescribed and submitted to patient's pharmacy.    6.  Patient will continue atorvastatin 40mg qd  7.  Discharge forms signed and copies in chart

## 2023-11-20 DIAGNOSIS — I25.10 ATHEROSCLEROTIC HEART DISEASE OF NATIVE CORONARY ARTERY WITHOUT ANGINA PECTORIS: ICD-10-CM

## 2023-11-20 DIAGNOSIS — T82.855A STENOSIS OF CORONARY ARTERY STENT, INITIAL ENCOUNTER: ICD-10-CM
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
## 2023-12-07 LAB
ISTAT ACTK (ACTIVATED CLOTTING TIME KAOLIN): 266 SEC — HIGH (ref 74–137)
ISTAT ACTK (ACTIVATED CLOTTING TIME KAOLIN): 266 SEC — HIGH (ref 74–137)

## 2024-05-31 PROBLEM — Z00.00 ENCOUNTER FOR PREVENTIVE HEALTH EXAMINATION: Status: ACTIVE | Noted: 2024-05-31

## 2024-06-04 ENCOUNTER — APPOINTMENT (OUTPATIENT)
Dept: UROLOGY | Facility: CLINIC | Age: 69
End: 2024-06-04
Payer: MEDICARE

## 2024-06-04 VITALS
DIASTOLIC BLOOD PRESSURE: 82 MMHG | HEART RATE: 88 BPM | TEMPERATURE: 97.6 F | RESPIRATION RATE: 15 BRPM | HEIGHT: 69.65 IN | SYSTOLIC BLOOD PRESSURE: 132 MMHG | WEIGHT: 214 LBS | BODY MASS INDEX: 30.98 KG/M2 | OXYGEN SATURATION: 96 %

## 2024-06-04 DIAGNOSIS — R39.15 URGENCY OF URINATION: ICD-10-CM

## 2024-06-04 DIAGNOSIS — R39.9 UNSPECIFIED SYMPTOMS AND SIGNS INVOLVING THE GENITOURINARY SYSTEM: ICD-10-CM

## 2024-06-04 PROCEDURE — G2211 COMPLEX E/M VISIT ADD ON: CPT

## 2024-06-04 PROCEDURE — 99204 OFFICE O/P NEW MOD 45 MIN: CPT

## 2024-06-04 RX ORDER — CLOPIDOGREL BISULFATE 75 MG/1
75 TABLET, FILM COATED ORAL
Refills: 0 | Status: ACTIVE | COMMUNITY

## 2024-06-04 RX ORDER — TAMSULOSIN HYDROCHLORIDE 0.4 MG/1
0.4 CAPSULE ORAL
Refills: 0 | Status: ACTIVE | COMMUNITY

## 2024-06-04 RX ORDER — MIRABEGRON 50 MG/1
50 TABLET, EXTENDED RELEASE ORAL
Qty: 90 | Refills: 3 | Status: ACTIVE | COMMUNITY
Start: 2024-06-04 | End: 1900-01-01

## 2024-06-04 RX ORDER — ALOGLIPTIN AND METFORMIN HYDROCHLORIDE 12.5; 1 MG/1; MG/1
12.5-1 TABLET, FILM COATED ORAL
Refills: 0 | Status: ACTIVE | COMMUNITY

## 2024-06-04 RX ORDER — SERTRALINE HYDROCHLORIDE 50 MG/1
50 TABLET, FILM COATED ORAL
Refills: 0 | Status: ACTIVE | COMMUNITY

## 2024-06-04 RX ORDER — OMEPRAZOLE 20 MG/1
20 CAPSULE, DELAYED RELEASE ORAL
Refills: 0 | Status: ACTIVE | COMMUNITY

## 2024-06-04 RX ORDER — LOSARTAN POTASSIUM 50 MG/1
50 TABLET, FILM COATED ORAL
Refills: 0 | Status: ACTIVE | COMMUNITY

## 2024-06-04 RX ORDER — EZETIMIBE/ATORVASTATIN CALCIUM 10 MG-10MG
10-10 TABLET ORAL
Refills: 0 | Status: ACTIVE | COMMUNITY

## 2024-06-04 RX ORDER — ALFUZOSIN HYDROCHLORIDE 10 MG/1
10 TABLET, EXTENDED RELEASE ORAL
Refills: 0 | Status: ACTIVE | COMMUNITY

## 2024-06-04 RX ORDER — CARVEDILOL 3.12 MG/1
3.12 TABLET, FILM COATED ORAL
Refills: 0 | Status: ACTIVE | COMMUNITY

## 2024-06-04 RX ORDER — FINASTERIDE 5 MG/1
5 TABLET, FILM COATED ORAL
Refills: 0 | Status: ACTIVE | COMMUNITY

## 2024-06-05 NOTE — HISTORY OF PRESENT ILLNESS
[FreeTextEntry1] :   EVERETT MUELLER Dec 14 1955   Language: English  Date of First visit: 06/04/2024 Accompanied by: self  Contact info:  Referring Provider/PCP: Dr. Atkinson Fax: [phone]   CC/ Problem List: LUTS =============================================================================== FIRST VISIT / Summary: Very pleasant 68 year old M here for LUTS. LUTs x 5 years and most bothered by nocturia. He also feels has urgency with some drops of urine.  He was on tamsulosin in the past d/t retrograde ejaculation he was switched to alfuzosin and now also taking finasteride x1 year. Drinks 1/2 bottle of water during day and drinks water with medications before bed.  Denies dysuria, hematuria or constipation  IPSS 17 QOL 2   ------------------------------------------------------------------------------------------- INTERVAL VISITS:   ===============================================================================   PMH: DM, HLD, BPH, HTN, GERD Meds: ASA 81mg, clopidogrel, alfuzosin, finasteride, Prilosec, atorvastatin, vitamin D, metformin, vitamin B12, losartan All: denies FHx: no  malignancies SocHx: current smoker 1/2 ppd x20 years, no Etoh, , 3 children,    PSH: cardiac stents x5 2023, partial thyroidectomy 15 years ago    ROS: Review of Systems is as per HPI unless otherwise denoted below   =============================================================================== DATA: LABS (SELECTED):-----------------------------------------------------------------------------  2/5/2024 BUN/Cr 18/1.20, eGFR 66, PSA 0.18 (5-ARIx1 year), A1C 8   RADS:-------------------------------------------------------------------------------------------------------------------     PATHOLOGY/CYTOLOGY:-------------------------------------------------------------------------------------------     VOIDING STUDIES: ----------------------------------------------------------------------------  06/04/2024 PVR 0   STONE STUDIES: (Analysis/DENISHA)----------------------------------------------------------------------------------     PROCEDURES: -----------------------------------------------------------------------------------------------       =============================================================================== PHYSICAL EXAM:    FOCUSED: ----------------------------------------------------------------------------------------------------------------     ======================================================================================= DISCUSSION: I explained that BPH is a condition of prostatic enlargement that occludes the bladder outlet and can make it difficult to void. Over time this can manifest itself as urinary frequency, urgency, straining to void, poor stream, nocturia, high PVR's and retention. While BPH is related to prostate volume (increasing prostate volume results in increased likelihood of complications from BPH), there are many men with "normal volume" prostates that have symptoms of occlusion.  We discussed the effects that BPH can cause on the bladder: BPH can cause detrusor overactivity which results in urgency and frequency and in some cases, incontinence. Over a longer time, some men may develop large volume/acontractile bladders, while other men develop small or normal volume bladders with loss of compliance. Neither is ideal and both represent end stage bladder damage that cannot be fixed and can cause kidney injury.   I explained the 3 main jobs of the bladder which are to 1) store urine, 2) evacuate urine and 3) keep urine at low pressure to prevent upper tract injury. I explained the mechanisms of upper tract injury, hydro and EDMAR due to AUR to the patient.  This can be severe, life threatening and irreversible.  I discussed the treatment of BPH with him. We generally start with medical management and proceed to surgery if necessary. Alpha blockers act to relax the prostate while 5-ALE's shrink the prostate. 5-ALE's are most effective in patients who have prostates that are >40gm. Alpha blockers are fairly fast acting (in a matter of days-weeks) while 5-ALE's can take up to 3-6 months to take effect.    Anticholinergics and beta-3 agonists merely treat the bladder symptoms that are caused by the sequelae of WISE. They "relax" the bladder and they can weaken the urine stream and make retention worse or precipitate retention. They do not address the underlying cause of overactivity in this setting but do ease bladder spasms and can help with urgency and frequency. They are useful in certain carefully selected patients.  If medical management fails, the patient cannot wait for medical management to work, or the patient has EDMAR caused by AUR, he may be offered surgery. There are different surgical options: Microwave, Rezum, Urolift, Prostatic Artery Embolization (PAE), KTP (Greenlight) laser, Aquablation, Standard (Monopolar or Bipolar) TURP, and Holmium Laser Enucleation of the Prostate (HOLEP). I generally do not recommend Microwave as it is a blind procedure. Urolift and Rezum are best for patients who have mild-moderate symptoms but are not in retention and do not have very large glands. Greenlight lasers do not have very durable results but are best for patients who cannot be off of anticoagulation. For patients with severe symptoms or who are in retention or want the most definitive treatment, PAE, HOLEP and TURP are preferred. We discussed the risks including bleeding, infection, dysuria, damage to the urethra, bladder and ureteral orifices, scar tissue, and leaking (either due to injuring the sphincter or lowering bladder outlet obstruction with concomitant DO). If the patient is planning on having any additional children, any of the above treatments can also result in retrograde ejaculation (both medications and surgical treatments). The patient understands that some of these complications are reversible while some are not, and may require additional procedures.  ======================================================================================= ASSESSMENT and PLAN   1. BPH -UA/UCx -prescribed Myrbetriq, reviewed SE -uroflow at next visit -discussed cystoscopy to evaluate if prostate obstructing. Patient wishes to avoid prostate surgery if possible -f/u in 2 months      =======================================================================================  4g Thank you for allowing me to assist in the care of your patient. Should you have any questions please do not hesitate to reach out to me.     Kehinde Dhillon MD                                                        [facility]   [place] Office: [address], [address] [place] [address] T: [phone] F: [phone]   [place] Office: [address], 1st [place] [address] T: [phone] F: [phone]

## 2024-06-07 DIAGNOSIS — R31.29 OTHER MICROSCOPIC HEMATURIA: ICD-10-CM

## 2024-06-07 LAB
APPEARANCE: CLEAR
BACTERIA UR CULT: NORMAL
BACTERIA: NEGATIVE /HPF
BILIRUBIN URINE: NEGATIVE
BLOOD URINE: NEGATIVE
CAST: 0 /LPF
COLOR: YELLOW
EPITHELIAL CELLS: 0 /HPF
GLUCOSE QUALITATIVE U: NEGATIVE MG/DL
KETONES URINE: NEGATIVE MG/DL
LEUKOCYTE ESTERASE URINE: NEGATIVE
MICROSCOPIC-UA: NORMAL
NITRITE URINE: NEGATIVE
PH URINE: 5.5
PROTEIN URINE: NORMAL MG/DL
RED BLOOD CELLS URINE: 4 /HPF
SPECIFIC GRAVITY URINE: 1.02
UROBILINOGEN URINE: 0.2 MG/DL
WHITE BLOOD CELLS URINE: 0 /HPF

## 2024-08-06 ENCOUNTER — APPOINTMENT (OUTPATIENT)
Age: 69
End: 2024-08-06

## 2024-08-06 PROCEDURE — G2211 COMPLEX E/M VISIT ADD ON: CPT

## 2024-08-06 PROCEDURE — 99214 OFFICE O/P EST MOD 30 MIN: CPT

## 2024-08-07 NOTE — HISTORY OF PRESENT ILLNESS
[FreeTextEntry1] : EVERETT MUELLER Dec 14 1955  Language: English Date of First visit: 06/04/2024 Accompanied by: self Contact info: Referring Provider/PCP: Dr. Atkinson Fax: [phone]  CC/ Problem List: LUTS =============================================================================== FIRST VISIT / Summary: Very pleasant 68 year old M here for LUTS. LUTs x 5 years and most bothered by nocturia. He also feels has urgency with some drops of urine. He was on tamsulosin in the past d/t retrograde ejaculation he was switched to alfuzosin and now also taking finasteride x1 year. Drinks 1/2 bottle of water during day and drinks water with medications before bed. Denies dysuria, hematuria or constipation IPSS 17 QOL 2  ------------------------------------------------------------------------------------------- INTERVAL VISITS: The patient's medications and allergies were reviewed and edited below. Dated 08/06/2024   here for uroflow. Results discussed, some slowing but overall good result.  ===============================================================================  PMH: DM, HLD, BPH, HTN, GERD Meds: ASA 81mg, clopidogrel, alfuzosin, finasteride, Prilosec, atorvastatin, vitamin D, metformin, vitamin B12, losartan All: denies FHx: no  malignancies SocHx: current smoker 1/2 ppd x20 years, no Etoh, , 3 children,   PSH: cardiac stents x5 2023, partial thyroidectomy 15 years ago  ROS: Review of Systems is as per HPI unless otherwise denoted below  =============================================================================== DATA: LABS (SELECTED):----------------------------------------------------------------------------- 2/5/2024 BUN/Cr 18/1.20, eGFR 66, PSA 0.18 (5-ARIx1 year), A1C 8  RADS:-------------------------------------------------------------------------------------------------------------------   PATHOLOGY/CYTOLOGY:-------------------------------------------------------------------------------------------   VOIDING STUDIES: ---------------------------------------------------------------------------- 06/04/2024 PVR 0 08/06/2024 Uroflow with peak 11.2 and volume >150  STONE STUDIES: (Analysis/LLSA)----------------------------------------------------------------------------------   PROCEDURES: -----------------------------------------------------------------------------------------------    =============================================================================== PHYSICAL EXAM:   FOCUSED: ----------------------------------------------------------------------------------------------------------------   ======================================================================================= DISCUSSION: ======================================================================================= ASSESSMENT and PLAN  1. BPH -UA/UCx negative -continue tamsulosin -prescribed Myrbetriq, reviewed SE - he did not yet try, he will consider adding -uroflow good no surgery unless worsens -f/u in 1 year   ======================================================================================= The total time personally spent preparing for this visit (reviewing test results, obtaining external history) and during the visit (ordering tests/medications, spent face to face with the patient / family and counseling them on the above), as well as after the visit (on clinical documentation and coordination with other care providers) was approximately 35 minutes.  Thank you for allowing me to assist in the care of your patient. Should you have any questions please do not hesitate to reach out to me.   Kehinde Dhillon MD       [facility]  [place] Office: [address] [facility], [address] [place] [address] T: [phone] F: [phone]  [place] Office: [address], 1st [place] [address] T: [phone] F: [phone]

## 2024-11-05 ENCOUNTER — APPOINTMENT (OUTPATIENT)
Age: 69
End: 2024-11-05
Payer: MEDICARE

## 2024-11-05 VITALS
TEMPERATURE: 97 F | HEIGHT: 70 IN | WEIGHT: 210 LBS | RESPIRATION RATE: 14 BRPM | SYSTOLIC BLOOD PRESSURE: 138 MMHG | OXYGEN SATURATION: 96 % | HEART RATE: 81 BPM | DIASTOLIC BLOOD PRESSURE: 80 MMHG | BODY MASS INDEX: 30.06 KG/M2

## 2024-11-05 DIAGNOSIS — R31.29 OTHER MICROSCOPIC HEMATURIA: ICD-10-CM

## 2024-11-05 DIAGNOSIS — N40.1 BENIGN PROSTATIC HYPERPLASIA WITH LOWER URINARY TRACT SYMPMS: ICD-10-CM

## 2024-11-05 DIAGNOSIS — R39.9 UNSPECIFIED SYMPTOMS AND SIGNS INVOLVING THE GENITOURINARY SYSTEM: ICD-10-CM

## 2024-11-05 PROCEDURE — 99215 OFFICE O/P EST HI 40 MIN: CPT | Mod: 25

## 2024-11-05 PROCEDURE — 52000 CYSTOURETHROSCOPY: CPT

## 2024-11-07 LAB — BACTERIA UR CULT: NORMAL

## 2024-12-05 DIAGNOSIS — R39.9 UNSPECIFIED SYMPTOMS AND SIGNS INVOLVING THE GENITOURINARY SYSTEM: ICD-10-CM

## 2024-12-05 DIAGNOSIS — N40.1 BENIGN PROSTATIC HYPERPLASIA WITH LOWER URINARY TRACT SYMPMS: ICD-10-CM

## 2024-12-10 ENCOUNTER — NON-APPOINTMENT (OUTPATIENT)
Age: 69
End: 2024-12-10

## 2024-12-18 NOTE — ASU PATIENT PROFILE, ADULT - NSICDXPASTSURGICALHX_GEN_ALL_CORE_FT
PAST SURGICAL HISTORY:  H/O partial thyroidectomy      PAST SURGICAL HISTORY:  H/O heart artery stent     H/O partial thyroidectomy

## 2024-12-18 NOTE — ASU PATIENT PROFILE, ADULT - NS PREOP UNDERSTANDS INFO
No solid food for 8 hours before surger/ no chewing gums or hard candy while fasting/ wear loose fitting comfortable clothes/yes No solid food for 8 hours before surgery/ no chewing gums or hard candy while fasting/ wear loose fitting comfortable clothes/yes

## 2024-12-19 ENCOUNTER — TRANSCRIPTION ENCOUNTER (OUTPATIENT)
Age: 69
End: 2024-12-19

## 2024-12-19 ENCOUNTER — APPOINTMENT (OUTPATIENT)
Dept: UROLOGY | Facility: AMBULATORY SURGERY CENTER | Age: 69
End: 2024-12-19

## 2024-12-19 ENCOUNTER — OUTPATIENT (OUTPATIENT)
Dept: OUTPATIENT SERVICES | Facility: HOSPITAL | Age: 69
LOS: 1 days | Discharge: HOME | End: 2024-12-19
Payer: MEDICARE

## 2024-12-19 VITALS
RESPIRATION RATE: 16 BRPM | HEART RATE: 86 BPM | WEIGHT: 201.94 LBS | TEMPERATURE: 97 F | SYSTOLIC BLOOD PRESSURE: 107 MMHG | HEIGHT: 70 IN | OXYGEN SATURATION: 97 % | DIASTOLIC BLOOD PRESSURE: 75 MMHG

## 2024-12-19 VITALS
TEMPERATURE: 98 F | OXYGEN SATURATION: 97 % | HEART RATE: 70 BPM | SYSTOLIC BLOOD PRESSURE: 145 MMHG | RESPIRATION RATE: 15 BRPM | DIASTOLIC BLOOD PRESSURE: 82 MMHG

## 2024-12-19 DIAGNOSIS — Z95.5 PRESENCE OF CORONARY ANGIOPLASTY IMPLANT AND GRAFT: Chronic | ICD-10-CM

## 2024-12-19 DIAGNOSIS — Z90.09 ACQUIRED ABSENCE OF OTHER PART OF HEAD AND NECK: Chronic | ICD-10-CM

## 2024-12-19 PROCEDURE — 52601 PROSTATECTOMY (TURP): CPT

## 2024-12-19 PROCEDURE — 88305 TISSUE EXAM BY PATHOLOGIST: CPT | Mod: 26

## 2024-12-19 RX ORDER — ALFUZOSIN HYDROCHLORIDE 10 MG/1
0 TABLET ORAL
Refills: 0 | DISCHARGE

## 2024-12-19 RX ORDER — ROSUVASTATIN CALCIUM 5 MG/1
0 TABLET, FILM COATED ORAL
Refills: 0 | DISCHARGE

## 2024-12-19 RX ORDER — PHENAZOPYRIDINE HCL 200 MG
1 TABLET ORAL
Qty: 9 | Refills: 0
Start: 2024-12-19 | End: 2024-12-21

## 2024-12-19 RX ORDER — PHENAZOPYRIDINE HCL 200 MG
200 TABLET ORAL EVERY 8 HOURS
Refills: 0 | Status: DISCONTINUED | OUTPATIENT
Start: 2024-12-19 | End: 2024-12-19

## 2024-12-19 RX ORDER — 0.9 % SODIUM CHLORIDE 0.9 %
500 INTRAVENOUS SOLUTION INTRAVENOUS
Refills: 0 | Status: DISCONTINUED | OUTPATIENT
Start: 2024-12-19 | End: 2024-12-19

## 2024-12-19 RX ORDER — GABAPENTIN 300 MG/1
1 CAPSULE ORAL
Refills: 0 | DISCHARGE

## 2024-12-19 RX ORDER — FENTANYL 12 UG/H
25 PATCH, EXTENDED RELEASE TRANSDERMAL
Refills: 0 | Status: DISCONTINUED | OUTPATIENT
Start: 2024-12-19 | End: 2024-12-19

## 2024-12-19 RX ORDER — DAPAGLIFLOZIN 10 MG/1
1 TABLET, FILM COATED ORAL
Refills: 0 | DISCHARGE

## 2024-12-19 RX ORDER — SITAGLIPTIN AND METFORMIN HYDROCHLORIDE 1000; 50 MG/1; MG/1
1 TABLET, FILM COATED ORAL
Refills: 0 | DISCHARGE

## 2024-12-19 RX ORDER — LIDOCAINE HCL 20 MG/ML
10 VIAL (ML) INJECTION ONCE
Refills: 0 | Status: COMPLETED | OUTPATIENT
Start: 2024-12-19 | End: 2024-12-19

## 2024-12-19 RX ORDER — MIRABEGRON 25 MG/1
1 TABLET, FILM COATED, EXTENDED RELEASE ORAL
Refills: 0 | DISCHARGE

## 2024-12-19 RX ADMIN — Medication 200 MILLIGRAM(S): at 17:22

## 2024-12-19 RX ADMIN — Medication 10 MILLILITER(S): at 17:36

## 2024-12-19 RX ADMIN — Medication 100 MILLILITER(S): at 17:36

## 2024-12-19 RX ADMIN — FENTANYL 25 MICROGRAM(S): 12 PATCH, EXTENDED RELEASE TRANSDERMAL at 18:08

## 2024-12-19 RX ADMIN — FENTANYL 25 MICROGRAM(S): 12 PATCH, EXTENDED RELEASE TRANSDERMAL at 17:53

## 2024-12-19 NOTE — ASU DISCHARGE PLAN (ADULT/PEDIATRIC) - NS MD DC FALL RISK RISK
For information on Fall & Injury Prevention, visit: [email or web address] OR  [email or web address] OR  https://www.cdc.gov/steadi/patient.html

## 2024-12-19 NOTE — BRIEF OPERATIVE NOTE - NSICDXBRIEFPROCEDURE_GEN_ALL_CORE_FT
PROCEDURES:  Transurethral resection of prostate (TURP) with enucleation 19-Dec-2024 14:56:02  Megan Brush

## 2024-12-19 NOTE — ASU DISCHARGE PLAN (ADULT/PEDIATRIC) - ASU DC SPECIAL INSTRUCTIONSFT
TRANSURETHRAL RESECTION OF PROSTATE    GENERAL: It is common to have blood in your urine after your procedure.  It may be pink or even red; and it is important to increase fluid intake to 2-3L of water per day to keep the urine as clear as possible. Please inform your doctor if you have a significant amount of clot in the urine or if you are unable to void at all.  The urine may clear and then become bloody again especially as you are more physically active. It is not uncommon to have some burning when you urinate, this will gradually improve. With a catheter in place, it is not uncommon to have occasional leakage or urine or blood around the catheter. Please call your urologist if this is excessive and/or the urine is not draining through the catheter into the bag.    CATHETER: Most patients are sent home with a Martins catheter, which continuously drains the urine from the bladder. If you still have a catheter, the nurses will review instructions and care before you go home. For men, you may have a prescription for lidocaine jelly to apply to the tip of your penis, as needed, for catheter related discomfort.    UROLOGIC MEDICATIONS:  The following medications may have been sent to your pharmacy for stent  PAIN: You may take Tylenol (acetaminophen) 650-975mg and/or Motrin (ibuprofen) 400-600mg, both available over the counter, for pain every 6 hours as needed. Do not exceed 4000mg of Tylenol (acetaminophen) daily. You may alternate these medications such that you take one or the other every 3 hours for around the clock pain coverage.    ANTIBIOTICS: You may be given a prescription for an antibiotic, please take this medication as instructed and be sure to complete the entire course.     STOOL SOFTENERS: Do not allow yourself to become constipated as straining may cause bleeding. Take stool softeners or a laxative (ex. Miralax, Colace, Senokot, ExLax, etc), available over the counter, if needed.    ANTICOAGULATION: If you are taking any blood thinning medications, please discuss with your urologist prior to restarting these medications unless otherwise specified.    BATHING: You may shower or bathe. If going home with martins, shower only until catheter is removed.    DIET: You may resume your regular diet and regular medication regimen.    ACTIVITY: No heavy lifting or strenuous exercise until you are evaluated at your post-operative appointment. Otherwise, you may return to your usual level of physical activity.    FOLLOW-UP: If you did not already schedule your post-operative appointment, please call your urologist to schedule and follow-up appointment.    CALL YOUR UROLOGIST IF: You have any bleeding that does not stop, inability to void >8 hours, fever over 100.4 F, chills, persistent nausea/vomiting, changes in your incision concerning for infection, or if your pain is not controlled on your discharge pain medications. TRANSURETHRAL RESECTION OF PROSTATE  Please take pyridium 200mg every 8 hours as needed if you have bladder discomfort.     GENERAL: It is common to have blood in your urine after your procedure.  It may be pink or even red; and it is important to increase fluid intake to 2-3L of water per day to keep the urine as clear as possible. Please inform your doctor if you have a significant amount of clot in the urine or if you are unable to void at all.  The urine may clear and then become bloody again especially as you are more physically active. It is not uncommon to have some burning when you urinate, this will gradually improve. With a catheter in place, it is not uncommon to have occasional leakage or urine or blood around the catheter. Please call your urologist if this is excessive and/or the urine is not draining through the catheter into the bag.    CATHETER: Most patients are sent home with a Martins catheter, which continuously drains the urine from the bladder. If you still have a catheter, the nurses will review instructions and care before you go home. For men, you may have a prescription for lidocaine jelly to apply to the tip of your penis, as needed, for catheter related discomfort.    UROLOGIC MEDICATIONS:  The following medications may have been sent to your pharmacy for stent  PAIN: You may take Tylenol (acetaminophen) 650-975mg and/or Motrin (ibuprofen) 400-600mg, both available over the counter, for pain every 6 hours as needed. Do not exceed 4000mg of Tylenol (acetaminophen) daily. You may alternate these medications such that you take one or the other every 3 hours for around the clock pain coverage.    ANTIBIOTICS: You may be given a prescription for an antibiotic, please take this medication as instructed and be sure to complete the entire course.     STOOL SOFTENERS: Do not allow yourself to become constipated as straining may cause bleeding. Take stool softeners or a laxative (ex. Miralax, Colace, Senokot, ExLax, etc), available over the counter, if needed.    ANTICOAGULATION: If you are taking any blood thinning medications, please discuss with your urologist prior to restarting these medications unless otherwise specified.    BATHING: You may shower or bathe. If going home with martins, shower only until catheter is removed.    DIET: You may resume your regular diet and regular medication regimen.    ACTIVITY: No heavy lifting or strenuous exercise until you are evaluated at your post-operative appointment. Otherwise, you may return to your usual level of physical activity.    FOLLOW-UP: If you did not already schedule your post-operative appointment, please call your urologist to schedule and follow-up appointment.    CALL YOUR UROLOGIST IF: You have any bleeding that does not stop, inability to void >8 hours, fever over 100.4 F, chills, persistent nausea/vomiting, changes in your incision concerning for infection, or if your pain is not controlled on your discharge pain medications.

## 2024-12-19 NOTE — ASU DISCHARGE PLAN (ADULT/PEDIATRIC) - FINANCIAL ASSISTANCE
[facility] provides services at a reduced cost to those who are determined to be eligible through [facility]’s financial assistance program. Information regarding [facility]’s financial assistance program can be found by going to [email or web address] or by calling [phone].

## 2024-12-19 NOTE — PRE-ANESTHESIA EVALUATION ADULT - NSANTHOSAYNRD_GEN_A_CORE
No. TUSHAR screening performed.  STOP BANG Legend: 0-2 = LOW Risk; 3-4 = INTERMEDIATE Risk; 5-8 = HIGH Risk

## 2024-12-20 ENCOUNTER — APPOINTMENT (OUTPATIENT)
Dept: UROLOGY | Facility: CLINIC | Age: 69
End: 2024-12-20

## 2024-12-20 VITALS
TEMPERATURE: 97 F | OXYGEN SATURATION: 97 % | HEIGHT: 70 IN | RESPIRATION RATE: 14 BRPM | BODY MASS INDEX: 30.06 KG/M2 | SYSTOLIC BLOOD PRESSURE: 162 MMHG | HEART RATE: 71 BPM | WEIGHT: 210 LBS | DIASTOLIC BLOOD PRESSURE: 82 MMHG

## 2024-12-20 DIAGNOSIS — R39.9 UNSPECIFIED SYMPTOMS AND SIGNS INVOLVING THE GENITOURINARY SYSTEM: ICD-10-CM

## 2024-12-20 DIAGNOSIS — N40.1 BENIGN PROSTATIC HYPERPLASIA WITH LOWER URINARY TRACT SYMPMS: ICD-10-CM

## 2024-12-20 PROCEDURE — 51798 US URINE CAPACITY MEASURE: CPT

## 2024-12-20 PROCEDURE — 51700 IRRIGATION OF BLADDER: CPT | Mod: 58

## 2024-12-20 PROCEDURE — A4216: CPT | Mod: NC

## 2025-01-17 ENCOUNTER — APPOINTMENT (OUTPATIENT)
Dept: UROLOGY | Facility: CLINIC | Age: 70
End: 2025-01-17
Payer: MEDICARE

## 2025-01-17 VITALS
RESPIRATION RATE: 14 BRPM | DIASTOLIC BLOOD PRESSURE: 73 MMHG | OXYGEN SATURATION: 96 % | BODY MASS INDEX: 30.06 KG/M2 | TEMPERATURE: 96.3 F | HEIGHT: 70 IN | HEART RATE: 88 BPM | SYSTOLIC BLOOD PRESSURE: 107 MMHG | WEIGHT: 210 LBS

## 2025-01-17 DIAGNOSIS — R39.15 URGENCY OF URINATION: ICD-10-CM

## 2025-01-17 DIAGNOSIS — N40.1 BENIGN PROSTATIC HYPERPLASIA WITH LOWER URINARY TRACT SYMPMS: ICD-10-CM

## 2025-01-17 DIAGNOSIS — R39.9 UNSPECIFIED SYMPTOMS AND SIGNS INVOLVING THE GENITOURINARY SYSTEM: ICD-10-CM

## 2025-01-17 PROCEDURE — 99213 OFFICE O/P EST LOW 20 MIN: CPT | Mod: 24

## 2025-01-17 PROCEDURE — G2211 COMPLEX E/M VISIT ADD ON: CPT

## 2025-01-17 RX ORDER — MIRABEGRON 50 MG/1
50 TABLET, FILM COATED, EXTENDED RELEASE ORAL
Refills: 0 | Status: ACTIVE | COMMUNITY

## 2025-01-18 LAB
APPEARANCE: CLEAR
BACTERIA: NEGATIVE /HPF
BILIRUBIN URINE: NEGATIVE
BLOOD URINE: ABNORMAL
CAST: 3 /LPF
COLOR: NORMAL
EPITHELIAL CELLS: 3 /HPF
GLUCOSE QUALITATIVE U: >=1000 MG/DL
KETONES URINE: NEGATIVE MG/DL
LEUKOCYTE ESTERASE URINE: ABNORMAL
MICROSCOPIC-UA: NORMAL
NITRITE URINE: NEGATIVE
PH URINE: 5.5
PROTEIN URINE: 100 MG/DL
RED BLOOD CELLS URINE: 37 /HPF
SPECIFIC GRAVITY URINE: >1.03
UROBILINOGEN URINE: 1 MG/DL
WHITE BLOOD CELLS URINE: 75 /HPF

## 2025-01-20 LAB — BACTERIA UR CULT: NORMAL

## 2025-02-10 ENCOUNTER — RX RENEWAL (OUTPATIENT)
Age: 70
End: 2025-02-10

## 2025-03-18 ENCOUNTER — NON-APPOINTMENT (OUTPATIENT)
Age: 70
End: 2025-03-18

## 2025-03-18 ENCOUNTER — APPOINTMENT (OUTPATIENT)
Age: 70
End: 2025-03-18
Payer: MEDICARE

## 2025-03-18 VITALS
HEART RATE: 74 BPM | SYSTOLIC BLOOD PRESSURE: 132 MMHG | OXYGEN SATURATION: 97 % | RESPIRATION RATE: 14 BRPM | TEMPERATURE: 97 F | BODY MASS INDEX: 29.06 KG/M2 | DIASTOLIC BLOOD PRESSURE: 82 MMHG | HEIGHT: 70 IN | WEIGHT: 203 LBS

## 2025-03-18 DIAGNOSIS — R39.9 UNSPECIFIED SYMPTOMS AND SIGNS INVOLVING THE GENITOURINARY SYSTEM: ICD-10-CM

## 2025-03-18 DIAGNOSIS — K64.9 UNSPECIFIED HEMORRHOIDS: ICD-10-CM

## 2025-03-18 DIAGNOSIS — R39.15 URGENCY OF URINATION: ICD-10-CM

## 2025-03-18 PROCEDURE — 99214 OFFICE O/P EST MOD 30 MIN: CPT | Mod: 24

## 2025-03-18 RX ORDER — HYDROCORTISONE 25 MG/G
2.5 CREAM TOPICAL TWICE DAILY
Qty: 1 | Refills: 0 | Status: ACTIVE | COMMUNITY
Start: 2025-03-18 | End: 1900-01-01

## 2025-06-24 ENCOUNTER — RX RENEWAL (OUTPATIENT)
Age: 70
End: 2025-06-24

## (undated) DEVICE — UROVAC

## (undated) DEVICE — TUBING RANGER FLUID IRRIGATION SET DISP

## (undated) DEVICE — ELCTR PLASMA BUTTON OVAL 24FR 12-30 DEG

## (undated) DEVICE — TUBING SUCTION NONCONDUCTIVE 6MM X 12FT

## (undated) DEVICE — PREP BETADINE KIT

## (undated) DEVICE — VENODYNE/SCD SLEEVE CALF MEDIUM

## (undated) DEVICE — DRAINAGE BAG URINARY 2L

## (undated) DEVICE — GLV 7.5 PROTEXIS (WHITE)

## (undated) DEVICE — FOLEY CATH 3-WAY 20FR 30CC LATEX HEMATURIA

## (undated) DEVICE — NDL HYPO SAFE 18G X 1.5" (PINK)

## (undated) DEVICE — ELCTR PLASMA LOOP MEDIUM ANGLED 24FR 12-30 DEG

## (undated) DEVICE — FOLEY HOLDER STATLOCK 3 WAY ADULT

## (undated) DEVICE — FOLEY CATH 3-WAY 22FR 30CC LATEX HEMATURIA

## (undated) DEVICE — DRSG TELFA 3 X 8

## (undated) DEVICE — DRAINAGE BAG URINARY 4L

## (undated) DEVICE — DRAPE TOWEL BLUE 17" X 24"

## (undated) DEVICE — SOL IRR BAG NS 0.9% 3000ML

## (undated) DEVICE — FOLEY CATH 3-WAY 20FR 30CC LATEX LUBRICATH

## (undated) DEVICE — WARMING BLANKET UPPER ADULT

## (undated) DEVICE — PACK CYSTO

## (undated) DEVICE — POSITIONER FOAM EGG CRATE ULNAR 2PCS (PINK)